# Patient Record
Sex: MALE | Race: BLACK OR AFRICAN AMERICAN | Employment: UNEMPLOYED | URBAN - METROPOLITAN AREA
[De-identification: names, ages, dates, MRNs, and addresses within clinical notes are randomized per-mention and may not be internally consistent; named-entity substitution may affect disease eponyms.]

---

## 2019-06-13 ENCOUNTER — OFFICE VISIT (OUTPATIENT)
Dept: FAMILY MEDICINE CLINIC | Age: 24
End: 2019-06-13

## 2019-06-13 VITALS
WEIGHT: 205 LBS | TEMPERATURE: 98.6 F | RESPIRATION RATE: 18 BRPM | SYSTOLIC BLOOD PRESSURE: 119 MMHG | DIASTOLIC BLOOD PRESSURE: 81 MMHG | HEART RATE: 77 BPM | BODY MASS INDEX: 30.36 KG/M2 | HEIGHT: 69 IN | OXYGEN SATURATION: 95 %

## 2019-06-13 DIAGNOSIS — G43.009 MIGRAINE WITHOUT AURA AND WITHOUT STATUS MIGRAINOSUS, NOT INTRACTABLE: Primary | ICD-10-CM

## 2019-06-13 DIAGNOSIS — R41.840 INATTENTION: ICD-10-CM

## 2019-06-13 DIAGNOSIS — R41.840 POOR CONCENTRATION: ICD-10-CM

## 2019-06-13 DIAGNOSIS — R44.1 VISUAL HALLUCINATIONS: ICD-10-CM

## 2019-06-13 DIAGNOSIS — R44.0 AUDITORY HALLUCINATION: ICD-10-CM

## 2019-06-13 DIAGNOSIS — Z11.3 SCREENING EXAMINATION FOR STD (SEXUALLY TRANSMITTED DISEASE): ICD-10-CM

## 2019-06-13 RX ORDER — SUMATRIPTAN 50 MG/1
50 TABLET, FILM COATED ORAL
Qty: 9 TAB | Refills: 1 | Status: SHIPPED | OUTPATIENT
Start: 2019-06-13 | End: 2019-06-13

## 2019-06-13 RX ORDER — NAPROXEN 500 MG/1
500 TABLET ORAL
Qty: 30 TAB | Refills: 1 | Status: SHIPPED | OUTPATIENT
Start: 2019-06-13 | End: 2019-08-06 | Stop reason: SDUPTHER

## 2019-06-13 NOTE — PROGRESS NOTES
Chief Complaint   Patient presents with   [address]    Migraine    Referral Request     Patient in office today to [facility]. Pt have c/o of migraines that began 4 months ago. Pt states pain varies in location. Pt notes 2-3 migraines per week,can last up to 30 mins. Pt states sleep helps with pain,. Pt has not treated with otc. Pt denies n/v,blurred vision. Pt have c/o of inability to focus, and unable to keep job due to issues. Pt was seen in [facility] last yr for a couple days. Pt was seen at [facility] 6 months ago, pt stayed for 1 month was dx with anxiety and depression. 1. Have you been to the ER, urgent care clinic since your last visit? Hospitalized since your last visit? No    2. Have you seen or consulted any other health care providers outside of the [address] [place] [address] since your last visit? Include any pap smears or colon screening.  No

## 2019-06-13 NOTE — PATIENT INSTRUCTIONS
[facility] 12 [address]  Phone [phone]    For therapy  Mann Laird LCSW  [facility]  [address] [facility], [address]  [phone]     Panic, Anxiety, and Depression [address], [address] [place] [address], [facility]  [phone]    25 [facility] [address] [place] [address] [place], [address]  Phone: [phone]  Fax: [phone]    [facility] 111 [facility], [address] [place] [address]  Phone: [phone]  Fax: [phone] 324 [facility], [address] [place] [address]  Phone: [phone]  Fax: [phone]           Migraine Headache: Care Instructions  Your Care Instructions  Migraines are painful, throbbing headaches that often start on one side of the head. They may cause nausea and vomiting and make you sensitive to light, sound, or smell. Without treatment, migraines can last from 4 hours to a few days. Medicines can help prevent migraines or stop them after they have started. Your doctor can help you find which ones work best for you. Follow-up care is a key part of your treatment and safety. Be sure to make and go to all appointments, and call your doctor if you are having problems. It's also a good idea to know your test results and keep a list of the medicines you take. How can you care for yourself at home? · Do not drive if you have taken a prescription pain medicine. · Rest in a quiet, dark room until your headache is gone. Close your eyes, and try to relax or go to sleep. Don't watch TV or read. · Put a cold, moist cloth or cold pack on the painful area for 10 to 20 minutes at a time. Put a thin cloth between the cold pack and your skin. · Use a warm, moist towel or a heating pad set on low to relax tight shoulder and neck muscles.   · Have someone gently massage your neck and shoulders. · Take your medicines exactly as prescribed. Call your doctor if you think you are having a problem with your medicine. You will get more details on the specific medicines your doctor prescribes. · Be careful not to take pain medicine more often than the instructions allow. You could get worse or more frequent headaches when the medicine wears off. To prevent migraines  · Keep a headache diary so you can figure out what triggers your headaches. Avoiding triggers may help you prevent headaches. Record when each headache began, how long it lasted, and what the pain was like. (Was it throbbing, aching, stabbing, or dull?) Write down any other symptoms you had with the headache, such as nausea, flashing lights or dark spots, or sensitivity to bright light or loud noise. Note if the headache occurred near your period. List anything that might have triggered the headache. Triggers may include certain foods (chocolate, cheese, wine) or odors, smoke, bright light, stress, or lack of sleep. · If your doctor has prescribed medicine for your migraines, take it as directed. You may have medicine that you take only when you get a migraine and medicine that you take all the time to help prevent migraines. ? If your doctor has prescribed medicine for when you get a headache, take it at the first sign of a migraine, unless your doctor has given you other instructions. ? If your doctor has prescribed medicine to prevent migraines, take it exactly as prescribed. Call your doctor if you think you are having a problem with your medicine. · Find healthy ways to deal with stress. Migraines are most common during or right after stressful times. Take time to relax before and after you do something that has caused a migraine in the past.  · Try to keep your muscles relaxed by keeping good posture. Check your jaw, face, neck, and shoulder muscles for tension. Try to relax them.  When you sit at a desk, change positions often. And make sure to stretch for 30 seconds each hour. · Get plenty of sleep and exercise. · Eat meals on a regular schedule. Avoid foods and drinks that often trigger migraines. These include chocolate, alcohol (especially red wine and port), aspartame, monosodium glutamate (MSG), and some additives found in foods (such as hot dogs, shelton, cold cuts, aged cheeses, and pickled foods). · Limit caffeine. Don't drink too much coffee, tea, or soda. But don't quit caffeine suddenly. That can also give you migraines. · Do not smoke or allow others to smoke around you. If you need help quitting, talk to your doctor about stop-smoking programs and medicines. These can increase your chances of quitting for good. · If you are taking birth control pills or hormone therapy, talk to your doctor about whether they are triggering your migraines. When should you call for help? Call 911 anytime you think you may need emergency care. For example, call if:    · You have signs of a stroke. These may include:  ? Sudden numbness, paralysis, or weakness in your face, arm, or leg, especially on only one side of your body. ? Sudden vision changes. ? Sudden trouble speaking. ? Sudden confusion or trouble understanding simple statements. ? Sudden problems with walking or balance. ? A sudden, severe headache that is different from past headaches.    Call your doctor now or seek immediate medical care if:    · You have new or worse nausea and vomiting.     · You have a new or higher fever.     · Your headache gets much worse.    Watch closely for changes in your health, and be sure to contact your doctor if:    · You are not getting better after 2 days (48 hours). Where can you learn more? Go to http://anna-doe.info/. Enter G276 in the search box to learn more about \"Migraine Headache: Care Instructions. \"  Current as of: Odilia 3, 2018  Content Version: 11.9  © 1112-2459 [employer], [facility].  Care instructions adapted under license by [employer] (which disclaims liability or warranty for this information). If you have questions about a medical condition or this instruction, always ask your healthcare professional. [address] any warranty or liability for your use of this information.

## 2019-06-13 NOTE — PROGRESS NOTES
Chief Complaint   Patient presents with   [address]    Migraine    Referral Request     Patient in office today to [facility]. Pt have c/o of migraines that began 4 months ago. Pt states pain varies in location. No consistent location. Positive photophobia. Denies any phonophobia. Reports loud music will help the headaches. Pt notes 2-3 migraines per week, can last up to 30 mins- 1 hour. Doesn't usually have more than one HA per day. Usually HA comes as the day goes by. Usually notices HA start when he starts thinking about certain things. Pt states sleep helps with pain. Pt has not treated with otc. Pt denies n/v, blurred vision. Poor memory at times. Poor focus and concentration. Pt has been out of work for the last year. Prior to that was working at [employer]. Pt have c/o of inability to focus, and unable to keep job due to issues. Pt was seen in [facility] last yr for a couple days. Pt was admitted for 1 week. Pt was seen at [facility] 6 months ago, pt stayed for 1 month was dx with anxiety and depression. Pt was referred to a counselor and only went for about 3 weeks. Pt will have auditory and visual hallucinations depending on the day. Hard to predict. Will report to his mother \"this not me. \"  Calos Prost being prescribed a \"blue pill\" for 1 month. Uses marijuana, not daily. Drinks etoh on occasion. Mom denies any family history of mental health problems. Denies any known exposure to STI. Denies any other concerns at this time. Chief Complaint   Patient presents with   [address]    Migraine    Referral Request     he is a 24y.o. year old male who presents for evalution. Reviewed PmHx, RxHx, FmHx, SocHx, AllgHx and updated and dated in the chart.     Review of Systems - negative except as listed above in the HPI    Objective:     Vitals:    06/13/19 1341   BP: 119/81   Pulse: 77   Resp: 18   Temp: 98.6 °F (37 °C)   TempSrc: Oral   SpO2: 95% Weight: 205 lb (93 kg)   Height: 5' 9.25\" (1.759 m)     Physical Examination: General appearance - alert, well appearing, and in no distress  Mental status - normal mood, behavior, speech, dress, motor activity, and thought processes  Eyes - pupils equal and reactive, extraocular eye movements intact  Ears - bilateral TM's and external ear canals normal  Nose - normal and patent, no erythema, discharge or polyps and normal nontender sinuses  Mouth - mucous membranes moist, pharynx normal without lesions  Neck - supple, no significant adenopathy, carotids upstroke normal bilaterally, no bruits, thyroid exam: thyroid is normal in size without nodules or tenderness  Chest - clear to auscultation, no wheezes, rales or rhonchi, symmetric air entry  Heart - normal rate, regular rhythm, normal S1, S2, no murmurs  Abdomen - soft, nontender, nondistended, no masses or organomegaly  bowel sounds normal  Neurological - DTR's normal and symmetric, motor and sensory grossly normal bilaterally, normal muscle tone, no tremors, strength 5/5  Extremities - peripheral pulses normal, no pedal edema, no clubbing or cyanosis  Skin - normal coloration and turgor, no rashes, no suspicious skin lesions noted    Assessment/ Plan:   Diagnoses and all orders for this visit:    1. Migraine without aura and without status migrainosus, not intractable  -     METABOLIC PANEL, COMPREHENSIVE  -     CBC WITH AUTOMATED DIFF  -     TSH 3RD GENERATION  -     HEMOGLOBIN A1C WITH EAG  -     VITAMIN D, 25 HYDROXY  -     IRON PROFILE  -     FERRITIN  -     SUMAtriptan (IMITREX) 50 mg tablet; Take 1 Tab by mouth once as needed for Migraine for up to 1 dose.  -     naproxen (NAPROSYN) 500 mg tablet; Take 1 Tab by mouth daily as needed (headache). Will notify results and deviate plan based on findings. Start imitrex and naproxen daily as needed for headache. Reviewed SEs/ADRs of medication.  Enc pt to keep a HA diary and follow up to review together if HAs persist or worsen. 2. Inattention / 3. Poor concentration / 4. Visual hallucinations / 5. Auditory hallucination  -     REFERRAL TO PSYCHIATRY  Discussed need to see psychiatry for further evaluation due to concern for schizophrenia or other mental health problem. 6. Screening examination for STD (sexually transmitted disease)  -     HIV 1/2 AG/AB, 4TH GENERATION,W RFLX CONFIRM  -     RPR  -     CT/NG/T.VAGINALIS AMPLIFICATION  Screening, asx. Follow-up and Dispositions    · Return if symptoms worsen or fail to improve. I have discussed the diagnosis with the patient and the intended plan as seen in the above orders. The patient has received an after-visit summary and questions were answered concerning future plans. Medication Side Effects and Warnings were discussed with patient: yes  Patient Labs were reviewed and or requested: yes  Patient Past Records were reviewed and or requested  yes  Patient / Caregiver Understanding of treatment plan was verbalized during office visit YES    PRINCESS Posadas    Patient Instructions     [facility] [address] [place] [address],[address]  [facility]  Phone [phone]    For therapy  Carlton Gomes LCSW  [facility]  [address] [facility]   [address]  [phone]     [facility] [address], [address] [place] [address]  [phone]    [address] [place] [address] Lee [address]  Phone: [phone]  Fax: [phone]    [address] [place] [address]  Phone: [phone]  Fax: [phone] Critical access hospital6 [facility], [address] [place] [address]  Phone: [phone]  Fax: [phone]           Migraine Headache: Care Instructions  Your Care Instructions  Migraines are painful, throbbing headaches that often start on one side of the head. They may cause nausea and vomiting and make you sensitive to light, sound, or smell. Without treatment, migraines can last from 4 hours to a few days. Medicines can help prevent migraines or stop them after they have started. Your doctor can help you find which ones work best for you. Follow-up care is a key part of your treatment and safety. Be sure to make and go to all appointments, and call your doctor if you are having problems. It's also a good idea to know your test results and keep a list of the medicines you take. How can you care for yourself at home? · Do not drive if you have taken a prescription pain medicine. · Rest in a quiet, dark room until your headache is gone. Close your eyes, and try to relax or go to sleep. Don't watch TV or read. · Put a cold, moist cloth or cold pack on the painful area for 10 to 20 minutes at a time. Put a thin cloth between the cold pack and your skin. · Use a warm, moist towel or a heating pad set on low to relax tight shoulder and neck muscles. · Have someone gently massage your neck and shoulders. · Take your medicines exactly as prescribed. Call your doctor if you think you are having a problem with your medicine. You will get more details on the specific medicines your doctor prescribes. · Be careful not to take pain medicine more often than the instructions allow. You could get worse or more frequent headaches when the medicine wears off. To prevent migraines  · Keep a headache diary so you can figure out what triggers your headaches. Avoiding triggers may help you prevent headaches. Record when each headache began, how long it lasted, and what the pain was like. (Was it throbbing, aching, stabbing, or dull?) Write down any other symptoms you had with the headache, such as nausea, flashing lights or dark spots, or sensitivity to bright light or loud noise. Note if the headache occurred near your period. List anything that might have triggered the headache. Triggers may include certain foods (chocolate, cheese, wine) or odors, smoke, bright light, stress, or lack of sleep. · If your doctor has prescribed medicine for your migraines, take it as directed. You may have medicine that you take only when you get a migraine and medicine that you take all the time to help prevent migraines. ? If your doctor has prescribed medicine for when you get a headache, take it at the first sign of a migraine, unless your doctor has given you other instructions. ? If your doctor has prescribed medicine to prevent migraines, take it exactly as prescribed. Call your doctor if you think you are having a problem with your medicine. · Find healthy ways to deal with stress. Migraines are most common during or right after stressful times. Take time to relax before and after you do something that has caused a migraine in the past.  · Try to keep your muscles relaxed by keeping good posture. Check your jaw, face, neck, and shoulder muscles for tension. Try to relax them. When you sit at a desk, change positions often. And make sure to stretch for 30 seconds each hour. · Get plenty of sleep and exercise. · Eat meals on a regular schedule. Avoid foods and drinks that often trigger migraines. These include chocolate, alcohol (especially red wine and port), aspartame, monosodium glutamate (MSG), and some additives found in foods (such as hot dogs, shelton, cold cuts, aged cheeses, and pickled foods). · Limit caffeine. Don't drink too much coffee, tea, or soda. But don't quit caffeine suddenly. That can also give you migraines. · Do not smoke or allow others to smoke around you. If you need help quitting, talk to your doctor about stop-smoking programs and medicines. These can increase your chances of quitting for good. · If you are taking birth control pills or hormone therapy, talk to your doctor about whether they are triggering your migraines. When should you call for help?   Call [phone] anytime you think you may need emergency care. For example, call if:    · You have signs of a stroke. These may include:  ? Sudden numbness, paralysis, or weakness in your face, arm, or leg, especially on only one side of your body. ? Sudden vision changes. ? Sudden trouble speaking. ? Sudden confusion or trouble understanding simple statements. ? Sudden problems with walking or balance. ? A sudden, severe headache that is different from past headaches.    Call your doctor now or seek immediate medical care if:    · You have new or worse nausea and vomiting.     · You have a new or higher fever.     · Your headache gets much worse.    Watch closely for changes in your health, and be sure to contact your doctor if:    · You are not getting better after 2 days (48 hours). Where can you learn more? Go to http://anna-doe.info/. Enter U867 in the search box to learn more about \"Migraine Headache: Care Instructions. \"  Current as of: Odilia 3, 2018  Content Version: 11.9  © 7253-4797 [employer]. Care instructions adapted under license by [employer] (which disclaims liability or warranty for this information). If you have questions about a medical condition or this instruction, always ask your healthcare professional. [address] any warranty or liability for your use of this information.

## 2019-06-15 LAB
25(OH)D3+25(OH)D2 SERPL-MCNC: 16.5 NG/ML (ref 30–100)
ALBUMIN SERPL-MCNC: 4.6 G/DL (ref 3.5–5.5)
ALBUMIN/GLOB SERPL: 1.4 {RATIO} (ref 1.2–2.2)
ALP SERPL-CCNC: 88 IU/L (ref 39–117)
ALT SERPL-CCNC: 14 IU/L (ref 0–44)
AST SERPL-CCNC: 13 IU/L (ref 0–40)
BASOPHILS # BLD AUTO: 0 X10E3/UL (ref 0–0.2)
BASOPHILS NFR BLD AUTO: 1 %
BILIRUB SERPL-MCNC: 0.4 MG/DL (ref 0–1.2)
BUN SERPL-MCNC: 8 MG/DL (ref 6–20)
BUN/CREAT SERPL: 10 (ref 9–20)
C TRACH RRNA SPEC QL NAA+PROBE: NEGATIVE
CALCIUM SERPL-MCNC: 10.1 MG/DL (ref 8.7–10.2)
CHLORIDE SERPL-SCNC: 102 MMOL/L (ref 96–106)
CO2 SERPL-SCNC: 24 MMOL/L (ref 20–29)
CREAT SERPL-MCNC: 0.8 MG/DL (ref 0.76–1.27)
EOSINOPHIL # BLD AUTO: 0.4 X10E3/UL (ref 0–0.4)
EOSINOPHIL NFR BLD AUTO: 5 %
ERYTHROCYTE [DISTWIDTH] IN BLOOD BY AUTOMATED COUNT: 14.8 % (ref 12.3–15.4)
EST. AVERAGE GLUCOSE BLD GHB EST-MCNC: 120 MG/DL
FERRITIN SERPL-MCNC: 132 NG/ML (ref 30–400)
GLOBULIN SER CALC-MCNC: 3.4 G/DL (ref 1.5–4.5)
GLUCOSE SERPL-MCNC: 82 MG/DL (ref 65–99)
HBA1C MFR BLD: 5.8 % (ref 4.8–5.6)
HCT VFR BLD AUTO: 41.8 % (ref 37.5–51)
HGB BLD-MCNC: 14.2 G/DL (ref 13–17.7)
HIV 1+2 AB+HIV1 P24 AG SERPL QL IA: NON REACTIVE
IMM GRANULOCYTES # BLD AUTO: 0 X10E3/UL (ref 0–0.1)
IMM GRANULOCYTES NFR BLD AUTO: 0 %
IRON SATN MFR SERPL: 49 % (ref 15–55)
IRON SERPL-MCNC: 128 UG/DL (ref 38–169)
LYMPHOCYTES # BLD AUTO: 2.4 X10E3/UL (ref 0.7–3.1)
LYMPHOCYTES NFR BLD AUTO: 30 %
MCH RBC QN AUTO: 29.8 PG (ref 26.6–33)
MCHC RBC AUTO-ENTMCNC: 34 G/DL (ref 31.5–35.7)
MCV RBC AUTO: 88 FL (ref 79–97)
MONOCYTES # BLD AUTO: 0.5 X10E3/UL (ref 0.1–0.9)
MONOCYTES NFR BLD AUTO: 6 %
N GONORRHOEA RRNA SPEC QL NAA+PROBE: NEGATIVE
NEUTROPHILS # BLD AUTO: 4.6 X10E3/UL (ref 1.4–7)
NEUTROPHILS NFR BLD AUTO: 58 %
PLATELET # BLD AUTO: 378 X10E3/UL (ref 150–450)
POTASSIUM SERPL-SCNC: 4.4 MMOL/L (ref 3.5–5.2)
PROT SERPL-MCNC: 8 G/DL (ref 6–8.5)
RBC # BLD AUTO: 4.76 X10E6/UL (ref 4.14–5.8)
RPR SER QL: NON REACTIVE
SODIUM SERPL-SCNC: 139 MMOL/L (ref 134–144)
T VAGINALIS RRNA VAG QL NAA+PROBE: NEGATIVE
TIBC SERPL-MCNC: 262 UG/DL (ref 250–450)
TSH SERPL DL<=0.005 MIU/L-ACNC: 1.48 UIU/ML (ref 0.45–4.5)
UIBC SERPL-MCNC: 134 UG/DL (ref 111–343)
WBC # BLD AUTO: 7.9 X10E3/UL (ref 3.4–10.8)

## 2019-06-16 PROBLEM — R73.03 PREDIABETES: Status: ACTIVE | Noted: 2019-06-16

## 2019-06-16 RX ORDER — ERGOCALCIFEROL 1.25 MG/1
50000 CAPSULE ORAL
Qty: 12 CAP | Refills: 0 | Status: ON HOLD | OUTPATIENT
Start: 2019-06-16 | End: 2020-07-21

## 2019-06-16 NOTE — PROGRESS NOTES
Please notify pt the followin. Negative for HIV and syphilis. 2. Iron levels are normal.   3. Vitamin D is very low. Weekly high dose vitamin D sent to pharmacy on file to take over the next 12 weeks. 4. Labs show he has prediabetes. Enc pt to work on following a low carb diet and increase exercise. I recommend he follow up in 3 months to repeat labs. All other labs are normal. Work on diet to improve sugar. Take weekly vitamin D as directed. Follow up in 3 months to repeat labs.

## 2019-08-06 DIAGNOSIS — G43.009 MIGRAINE WITHOUT AURA AND WITHOUT STATUS MIGRAINOSUS, NOT INTRACTABLE: ICD-10-CM

## 2019-08-06 RX ORDER — NAPROXEN 500 MG/1
TABLET ORAL
Qty: 30 TAB | Refills: 1 | Status: ON HOLD | OUTPATIENT
Start: 2019-08-06 | End: 2020-07-21

## 2020-07-17 ENCOUNTER — HOSPITAL ENCOUNTER (INPATIENT)
Age: 25
LOS: 13 days | Discharge: HOME | DRG: 885 | End: 2020-07-30
Attending: PSYCHIATRY & NEUROLOGY | Admitting: PSYCHIATRY & NEUROLOGY
Payer: COMMERCIAL

## 2020-07-17 ENCOUNTER — HOSPITAL ENCOUNTER (EMERGENCY)
Age: 25
Discharge: ANOTHER HOSPITAL | End: 2020-07-17
Attending: EMERGENCY MEDICINE
Payer: MEDICAID

## 2020-07-17 VITALS
RESPIRATION RATE: 18 BRPM | WEIGHT: 205 LBS | HEART RATE: 76 BPM | SYSTOLIC BLOOD PRESSURE: 132 MMHG | BODY MASS INDEX: 30.36 KG/M2 | TEMPERATURE: 98.2 F | HEIGHT: 69 IN | DIASTOLIC BLOOD PRESSURE: 84 MMHG | OXYGEN SATURATION: 98 %

## 2020-07-17 DIAGNOSIS — F29 PSYCHOSIS, UNSPECIFIED PSYCHOSIS TYPE (HCC): Primary | ICD-10-CM

## 2020-07-17 PROBLEM — F20.9 SCHIZOPHRENIA, UNSPECIFIED (HCC): Status: ACTIVE | Noted: 2020-07-17

## 2020-07-17 LAB
ALBUMIN SERPL-MCNC: 3.9 G/DL (ref 3.5–5)
ALBUMIN/GLOB SERPL: 1 {RATIO} (ref 1.1–2.2)
ALP SERPL-CCNC: 76 U/L (ref 45–117)
ALT SERPL-CCNC: 27 U/L (ref 12–78)
AMPHET UR QL SCN: NEGATIVE
ANION GAP SERPL CALC-SCNC: 8 MMOL/L (ref 5–15)
AST SERPL-CCNC: 35 U/L (ref 15–37)
BARBITURATES UR QL SCN: NEGATIVE
BASOPHILS # BLD: 0.1 K/UL (ref 0–0.1)
BASOPHILS NFR BLD: 1 % (ref 0–1)
BENZODIAZ UR QL: NEGATIVE
BILIRUB SERPL-MCNC: 0.4 MG/DL (ref 0.2–1)
BUN SERPL-MCNC: 10 MG/DL (ref 6–20)
BUN/CREAT SERPL: 9 (ref 12–20)
CALCIUM SERPL-MCNC: 9 MG/DL (ref 8.5–10.1)
CANNABINOIDS UR QL SCN: POSITIVE
CHLORIDE SERPL-SCNC: 104 MMOL/L (ref 97–108)
CO2 SERPL-SCNC: 28 MMOL/L (ref 21–32)
COCAINE UR QL SCN: NEGATIVE
CREAT SERPL-MCNC: 1.06 MG/DL (ref 0.7–1.3)
DIFFERENTIAL METHOD BLD: ABNORMAL
DRUG SCRN COMMENT,DRGCM: ABNORMAL
EOSINOPHIL # BLD: 0.2 K/UL (ref 0–0.4)
EOSINOPHIL NFR BLD: 2 % (ref 0–7)
ERYTHROCYTE [DISTWIDTH] IN BLOOD BY AUTOMATED COUNT: 12.9 % (ref 11.5–14.5)
ETHANOL SERPL-MCNC: <10 MG/DL
GLOBULIN SER CALC-MCNC: 4 G/DL (ref 2–4)
GLUCOSE SERPL-MCNC: 111 MG/DL (ref 65–100)
HCT VFR BLD AUTO: 38.8 % (ref 36.6–50.3)
HGB BLD-MCNC: 13.5 G/DL (ref 12.1–17)
IMM GRANULOCYTES # BLD AUTO: 0 K/UL (ref 0–0.04)
IMM GRANULOCYTES NFR BLD AUTO: 0 % (ref 0–0.5)
LYMPHOCYTES # BLD: 2.2 K/UL (ref 0.8–3.5)
LYMPHOCYTES NFR BLD: 17 % (ref 12–49)
MCH RBC QN AUTO: 30 PG (ref 26–34)
MCHC RBC AUTO-ENTMCNC: 34.8 G/DL (ref 30–36.5)
MCV RBC AUTO: 86.2 FL (ref 80–99)
METHADONE UR QL: NEGATIVE
MONOCYTES # BLD: 1.1 K/UL (ref 0–1)
MONOCYTES NFR BLD: 8 % (ref 5–13)
NEUTS SEG # BLD: 9.5 K/UL (ref 1.8–8)
NEUTS SEG NFR BLD: 72 % (ref 32–75)
NRBC # BLD: 0 K/UL (ref 0–0.01)
NRBC BLD-RTO: 0 PER 100 WBC
OPIATES UR QL: NEGATIVE
PCP UR QL: NEGATIVE
PLATELET # BLD AUTO: 265 K/UL (ref 150–400)
PMV BLD AUTO: 10.4 FL (ref 8.9–12.9)
POTASSIUM SERPL-SCNC: 3.4 MMOL/L (ref 3.5–5.1)
PROT SERPL-MCNC: 7.9 G/DL (ref 6.4–8.2)
RBC # BLD AUTO: 4.5 M/UL (ref 4.1–5.7)
SODIUM SERPL-SCNC: 140 MMOL/L (ref 136–145)
TSH SERPL DL<=0.05 MIU/L-ACNC: 0.68 UIU/ML (ref 0.36–3.74)
WBC # BLD AUTO: 13.1 K/UL (ref 4.1–11.1)

## 2020-07-17 PROCEDURE — 65220000003 HC RM SEMIPRIVATE PSYCH

## 2020-07-17 PROCEDURE — 80053 COMPREHEN METABOLIC PANEL: CPT

## 2020-07-17 PROCEDURE — 74011250637 HC RX REV CODE- 250/637: Performed by: EMERGENCY MEDICINE

## 2020-07-17 PROCEDURE — 84443 ASSAY THYROID STIM HORMONE: CPT

## 2020-07-17 PROCEDURE — 85025 COMPLETE CBC W/AUTO DIFF WBC: CPT

## 2020-07-17 PROCEDURE — 36415 COLL VENOUS BLD VENIPUNCTURE: CPT

## 2020-07-17 PROCEDURE — 74011000250 HC RX REV CODE- 250: Performed by: EMERGENCY MEDICINE

## 2020-07-17 PROCEDURE — 80307 DRUG TEST PRSMV CHEM ANLYZR: CPT

## 2020-07-17 PROCEDURE — 74011250636 HC RX REV CODE- 250/636: Performed by: NURSE PRACTITIONER

## 2020-07-17 PROCEDURE — 99285 EMERGENCY DEPT VISIT HI MDM: CPT

## 2020-07-17 RX ORDER — LORAZEPAM 2 MG/ML
1 INJECTION INTRAMUSCULAR
Status: DISCONTINUED | OUTPATIENT
Start: 2020-07-17 | End: 2020-07-21

## 2020-07-17 RX ORDER — ACETAMINOPHEN 325 MG/1
650 TABLET ORAL
Status: DISCONTINUED | OUTPATIENT
Start: 2020-07-17 | End: 2020-07-30 | Stop reason: HOSPADM

## 2020-07-17 RX ORDER — OLANZAPINE 5 MG/1
5 TABLET ORAL
Status: DISCONTINUED | OUTPATIENT
Start: 2020-07-17 | End: 2020-07-30 | Stop reason: HOSPADM

## 2020-07-17 RX ORDER — BENZTROPINE MESYLATE 1 MG/1
1 TABLET ORAL
Status: DISCONTINUED | OUTPATIENT
Start: 2020-07-17 | End: 2020-07-30 | Stop reason: HOSPADM

## 2020-07-17 RX ORDER — HYDROXYZINE 50 MG/1
50 TABLET, FILM COATED ORAL
Status: DISCONTINUED | OUTPATIENT
Start: 2020-07-17 | End: 2020-07-30 | Stop reason: HOSPADM

## 2020-07-17 RX ORDER — ADHESIVE BANDAGE
30 BANDAGE TOPICAL DAILY PRN
Status: DISCONTINUED | OUTPATIENT
Start: 2020-07-17 | End: 2020-07-30 | Stop reason: HOSPADM

## 2020-07-17 RX ORDER — DIPHENHYDRAMINE HYDROCHLORIDE 50 MG/ML
50 INJECTION, SOLUTION INTRAMUSCULAR; INTRAVENOUS
Status: DISCONTINUED | OUTPATIENT
Start: 2020-07-17 | End: 2020-07-21

## 2020-07-17 RX ORDER — OLANZAPINE 5 MG/1
10 TABLET, ORALLY DISINTEGRATING ORAL ONCE
Status: COMPLETED | OUTPATIENT
Start: 2020-07-17 | End: 2020-07-17

## 2020-07-17 RX ORDER — HALOPERIDOL 5 MG/ML
5 INJECTION INTRAMUSCULAR
Status: DISCONTINUED | OUTPATIENT
Start: 2020-07-17 | End: 2020-07-28

## 2020-07-17 RX ORDER — TRAZODONE HYDROCHLORIDE 50 MG/1
50 TABLET ORAL
Status: DISCONTINUED | OUTPATIENT
Start: 2020-07-17 | End: 2020-07-30 | Stop reason: HOSPADM

## 2020-07-17 RX ORDER — OLANZAPINE 5 MG/1
10 TABLET, ORALLY DISINTEGRATING ORAL
Status: DISCONTINUED | OUTPATIENT
Start: 2020-07-17 | End: 2020-07-17

## 2020-07-17 RX ORDER — BACITRACIN 500 UNIT/G
1 PACKET (EA) TOPICAL
Status: COMPLETED | OUTPATIENT
Start: 2020-07-17 | End: 2020-07-17

## 2020-07-17 RX ADMIN — HALOPERIDOL LACTATE 5 MG: 5 INJECTION, SOLUTION INTRAMUSCULAR at 21:10

## 2020-07-17 RX ADMIN — OLANZAPINE 10 MG: 5 TABLET, ORALLY DISINTEGRATING ORAL at 15:54

## 2020-07-17 RX ADMIN — LORAZEPAM 1 MG: 2 INJECTION INTRAMUSCULAR; INTRAVENOUS at 21:10

## 2020-07-17 RX ADMIN — BACITRACIN 1 PACKET: 500 OINTMENT TOPICAL at 13:22

## 2020-07-17 RX ADMIN — DIPHENHYDRAMINE HYDROCHLORIDE 50 MG: 50 INJECTION, SOLUTION INTRAMUSCULAR; INTRAVENOUS at 21:20

## 2020-07-17 NOTE — ED NOTES
TRANSFER - OUT REPORT:    Verbal report given to Kaley goodrichRN(name) on Anna Romeo  being transferred to Behavior health(unit) for routine progression of care       Report consisted of patients Situation, Background, Assessment and   Recommendations(SBAR). Information from the following report(s) SBAR, Kardex, ED Summary, [facility] and Recent Results was reviewed with the receiving nurse. Lines:       Opportunity for questions and clarification was provided.       Patient transported with:   [facility] Department

## 2020-07-17 NOTE — ED NOTES
Patient presents to ED under an ECO with c/o mental health. Police states that they were called to a hotel because patient was refusing to leave property and dancing and acting bizarre. .  Patient then went and sat in a  seat of the police car and refused to move. Was acting very bizarre therefore placed under an ECO and brought to the emergency department for evaluation. Patient is very tangential and unable to provide additional history or review of systems on my evaluation. Patient was refusing to answer questions and provide name to police. Respirations are at a regular rate, depth, and pattern. Call bell within reach. Will continue to monitor. Please reference nursing assessment. [employer] remains at bedside.

## 2020-07-17 NOTE — BH NOTES
At 2120:  PRN Medication Documentation    Specific patient behavior that led to need for PRN medication: possible dystonic reaction  Staff interventions attempted prior to PRN being given: patient placed in room  PRN medication given: benadryl  Patient response/effectiveness of PRN medication: will continue to monitor    At 2110:  PRN Medication Documentation    Specific patient behavior that led to need for PRN medication: agitation and increased aggression with verbal physical threats during admission process  Staff interventions attempted prior to PRN being given: therapeutic listening  PRN medication given: Ativan and Haldol  Patient response/effectiveness of PRN medication: will continue to monitor    At 2100:  Patient arrived via ambulatory in [facility] with PD from [id number] [facility] under the treatment of Dr. Angelo Rojas. Admission status TDO. PTA medication(s) verified by: need verifying. Patient chief complaint altered mental status with AV+. Patient oriented to unit and room. Patient behavior: agitated and irritable  Safety: Q15 min safety checks    Skin and Pressure Documentation  Primary Nurse, Parish Brooks RN and Rashid Hartman performed a dual skin assessment on this patient No impairment noted  Bob score is 23     Pressure Injury Documentation  (COMPLETE ONE LABEL PER PRESSURE INJURY)  For further information, please review corresponding Wound Care flowsheet. Anna Romeo has: No pressure injury noted and pressure ulcer prevention initiated. No pressure injury noted and pressure ulcer prevention initiated. TRANSFER - IN REPORT:    Verbal report received from Andi Koroma RN on Anna Romeo  being received from [address] [facility] for routine progression of care      Report consisted of patients Situation, Background, Assessment and   Recommendations(SBAR). Information from the following report(s) SBAR was reviewed with the receiving nurse.     Opportunity for questions and clarification was provided. Assessment completed upon patients arrival to unit and care assumed. Patient refused to leave motel, then sat in police cruiser and refused to move. UDS + THC. Patient delusional and laughs inappropriately. Patient is redirectable at times and sings to answer questions. Patient is homeless.

## 2020-07-17 NOTE — ED NOTES
Rayshawn Eagle from crisis who states that he touch base with the patient's mother and that he does have a long history of mental illness and was hospitalized multiple times. Apparently the family has been unable to locate him for several weeks and was relieved to learn that he was in the emergency department and will be getting help with his mental health.

## 2020-07-17 NOTE — ED PROVIDER NOTES
EMERGENCY DEPARTMENT HISTORY AND PHYSICAL EXAM      Date: 7/17/2020  Patient Name: Porter Freeman    History of Presenting Illness     Chief Complaint   Patient presents with   [address] Problem       History Provided By: Patient and police    HPI: Porter Freeman, 21 y.o. male with PMHx significant for unknown who presents in police custody for mental health problem. Apparently, patient was at a motel and they were not able to get him to leave. Patient then went and sat in a  seat of the police car and refused to move. Was acting very bizarre therefore placed under an ECO and brought to the emergency department for evaluation. Patient is very tangential and unable to provide additional history or review of systems on my evaluation. PCP: None    There are no other complaints, changes, or physical findings at this time. Current Outpatient Medications   Medication Sig Dispense Refill    naproxen (NAPROSYN) 500 mg tablet TAKE 1 TABLET BY MOUTH EVERY DAY AS NEEDED HEADACHE 30 Tab 1    ergocalciferol (ERGOCALCIFEROL) 50,000 unit capsule Take 1 Cap by mouth every seven (7) days. 12 Cap 0     Past History     Past Medical History:  No past medical history on file. Past Surgical History:  No past surgical history on file. Family History:  Family History   Problem Relation Age of Onset    Diabetes Paternal Uncle     Diabetes Maternal Grandmother      Social History:  Social History     Tobacco Use    Smoking status: Current Every Day Smoker     Packs/day: 0.25    Smokeless tobacco: Never Used   Substance Use Topics    Alcohol use: Yes     Alcohol/week: 3.0 standard drinks     Types: 3 Shots of liquor per week    Drug use: Yes     Frequency: 3.0 times per week     Types: Marijuana     Allergies:  No Known Allergies  Review of Systems   Review of Systems   Unable to perform ROS: Psychiatric disorder     Physical Exam   Physical Exam  Vitals signs and nursing note reviewed.    Constitutional: General: He is not in acute distress. Appearance: He is well-developed. HENT:      Head: Normocephalic and atraumatic. Eyes:      Conjunctiva/sclera: Conjunctivae normal.      Pupils: Pupils are equal, round, and reactive to light. Neck:      Musculoskeletal: Normal range of motion. Cardiovascular:      Rate and Rhythm: Normal rate and regular rhythm. Pulmonary:      Effort: Pulmonary effort is normal. No respiratory distress. Breath sounds: Normal breath sounds. No stridor. Abdominal:      General: There is no distension. Palpations: Abdomen is soft. Tenderness: There is no abdominal tenderness. Musculoskeletal: Normal range of motion. Skin:     General: Skin is warm and dry. Comments: Wound to the base of the right foot that does not appear infected   Neurological:      Mental Status: He is alert. Psychiatric:         Attention and Perception: He is inattentive. Mood and Affect: Affect is labile. Speech: Speech is tangential.         Behavior: Behavior is agitated. Diagnostic Study Results   Labs -     Recent Results (from the past 12 hour(s))   CBC WITH AUTOMATED DIFF    Collection Time: 07/17/20  1:19 PM   Result Value Ref Range    WBC 13.1 (H) 4.1 - 11.1 K/uL    RBC 4.50 4. 10 - 5.70 M/uL    HGB 13.5 12.1 - 17.0 g/dL    HCT 38.8 36.6 - 50.3 %    MCV 86.2 80.0 - 99.0 FL    MCH 30.0 26.0 - 34.0 PG    MCHC 34.8 30.0 - 36.5 g/dL    RDW 12.9 11.5 - 14.5 %    PLATELET 144 447 - 747 K/uL    MPV 10.4 8.9 - 12.9 FL    NRBC 0.0 0  WBC    ABSOLUTE NRBC 0.00 0.00 - 0.01 K/uL    NEUTROPHILS 72 32 - 75 %    LYMPHOCYTES 17 12 - 49 %    MONOCYTES 8 5 - 13 %    EOSINOPHILS 2 0 - 7 %    BASOPHILS 1 0 - 1 %    IMMATURE GRANULOCYTES 0 0.0 - 0.5 %    ABS. NEUTROPHILS 9.5 (H) 1.8 - 8.0 K/UL    ABS. LYMPHOCYTES 2.2 0.8 - 3.5 K/UL    ABS. MONOCYTES 1.1 (H) 0.0 - 1.0 K/UL    ABS. EOSINOPHILS 0.2 0.0 - 0.4 K/UL    ABS. BASOPHILS 0.1 0.0 - 0.1 K/UL    ABS. IMM. GRANS. 0.0 0.00 - 0.04 K/UL    DF AUTOMATED     METABOLIC PANEL, COMPREHENSIVE    Collection Time: 07/17/20  1:19 PM   Result Value Ref Range    Sodium 140 136 - 145 mmol/L    Potassium 3.4 (L) 3.5 - 5.1 mmol/L    Chloride 104 97 - 108 mmol/L    CO2 28 21 - 32 mmol/L    Anion gap 8 5 - 15 mmol/L    Glucose 111 (H) 65 - 100 mg/dL    BUN 10 6 - 20 MG/DL    Creatinine 1.06 0.70 - 1.30 MG/DL    BUN/Creatinine ratio 9 (L) 12 - 20      GFR est AA >60 >60 ml/min/1.73m2    GFR est non-AA >60 >60 ml/min/1.73m2    Calcium 9.0 8.5 - 10.1 MG/DL    Bilirubin, total 0.4 0.2 - 1.0 MG/DL    ALT (SGPT) 27 12 - 78 U/L    AST (SGOT) 35 15 - 37 U/L    Alk. phosphatase 76 45 - 117 U/L    Protein, total 7.9 6.4 - 8.2 g/dL    Albumin 3.9 3.5 - 5.0 g/dL    Globulin 4.0 2.0 - 4.0 g/dL    A-G Ratio 1.0 (L) 1.1 - 2.2     ETHYL ALCOHOL    Collection Time: 07/17/20  1:19 PM   Result Value Ref Range    ALCOHOL(ETHYL),SERUM <10 <10 MG/DL   TSH 3RD GENERATION    Collection Time: 07/17/20  1:19 PM   Result Value Ref Range    TSH 0.68 0.36 - 3.74 uIU/mL   DRUG SCREEN, URINE    Collection Time: 07/17/20  1:19 PM   Result Value Ref Range    AMPHETAMINES Negative NEG      BARBITURATES Negative NEG      BENZODIAZEPINES Negative NEG      COCAINE Negative NEG      METHADONE Negative NEG      OPIATES Negative NEG      PCP(PHENCYCLIDINE) Negative NEG      THC (TH-CANNABINOL) Positive (A) NEG      Drug screen comment (NOTE)        Radiologic Studies -   No orders to display     No results found. Medical Decision Making   I am the first provider for this patient. I reviewed the vital signs, available nursing notes, past medical history, past surgical history, family history and social history. Vital Signs-Reviewed the patient's vital signs.   Patient Vitals for the past 12 hrs:   Temp Pulse Resp BP SpO2   07/17/20 1842 98.2 °F (36.8 °C)       07/17/20 1757  72 16 107/63 100 %   07/17/20 1247   18 132/84        Pulse Oximetry Analysis - 100% on ra      Records Reviewed: Nursing Notes    Provider Notes (Medical Decision Making):   Patient presents in police custody for mental health problem. On my evaluation he is somewhat labile, tangential, intermittently agitated but able to be redirected. Will check basic labs, thyroid studies, alcohol level, UDS, crisis to see the patient. ED Course:   Initial assessment performed. The patients presenting problems have been discussed, and they are in agreement with the care plan formulated and outlined with them. I have encouraged them to ask questions as they arise throughout their visit. Spoke with Brigida Mills from crisis who states that he touch base with the patient's mother and that he does have a long history of mental illness and was hospitalized multiple times. Apparently the family has been unable to locate him for several weeks and was relieved to learn that he was in the emergency department and will be getting help with his mental health. ED Course as of Jul 17 1914   Fri Jul 17, 2020   1410 Patient is medically cleared for psych evaluation    [YC]   9504 Patient accepted at [facility] psych, Dr. Brendan Nunez      ED Course User Index  Carol Marie MD       Procedures:  Procedures    Critical Care:  none    Disposition:  Transfer to [facility] [place] psych    Diagnosis     Clinical Impression:   1. Psychosis, unspecified psychosis type ([facility] [address]        This note will not be viewable in [address] Please note that this dictation was completed with [employer], the computer voice recognition software. Quite often unanticipated grammatical, syntax, homophones, and other interpretive errors are inadvertently transcribed by the computer software. Please disregard these errors.   Please excuse any errors that have escaped final proofreading

## 2020-07-17 NOTE — ED NOTES
Patient resting in bed comfortably and in no acute distress and appears to be sleep. Patient provided meal tray, left at the bedside.

## 2020-07-18 PROCEDURE — 65220000003 HC RM SEMIPRIVATE PSYCH

## 2020-07-18 RX ORDER — LORATADINE 10 MG/1
10 TABLET ORAL
Status: DISCONTINUED | OUTPATIENT
Start: 2020-07-18 | End: 2020-07-30 | Stop reason: HOSPADM

## 2020-07-18 NOTE — INTERDISCIPLINARY ROUNDS
Behavioral Health Interdisciplinary Rounds     Patient Name: Ryan Reyes  Age: 21 y.o. Room/Bed:  734/02  Primary Diagnosis: <principal problem not specified>   Admission Status: TDO     Readmission within 30 days: no  Power of  in place: no  Patient requires a blocked bed: no          Reason for blocked bed:     VTE Prophylaxis: No    Mobility needs/Fall risk: no  Flu Vaccine : no   Nutritional Plan: no  Consults:          Labs/Testing due today?: no    Sleep hours:  8      Participation in Care/Groups:  no  Medication Compliant?: Yes  PRNS (last 24 hours): Antipsychotic (IM), Antianxiety and Anticholinergic    Restraints (last 24 hours):  no     CIWA (range last 24 hours):     COWS (range last 24 hours):      Alcohol screening (AUDIT) completed -   AUDIT Score: 0     If applicable, date SBIRT discussed in treatment team AND documented:   AUDIT Screen Score: AUDIT Score: 0      Tobacco - patient is a smoker: Have You Used Tobacco in the Past 30 Days: No  Illegal Drugs use: Have You Used Any Illegal Substances Over the Past 12 Months: No    24 hour chart check complete: yes     Patient goal(s) for today: Pt refused to meet with Tx Team   Treatment team focus/goals:  Reused face to face interview Psych Social Assessment with information / chart  Progress note Pt refused to meet with [address] [place] [address] team and he was in bed.     LOS:  1  Expected LOS: TBD    Financial concerns/prescription coverage:  TDO    Family contact:  Amina Bustillo ( Mother ) [phone]     Family requesting physician contact today:    Discharge plan: TBD - Maybe Homeless   Access to weapons :  TBD        Outpatient provider(s): TBD   Patient's preferred phone number for follow up call :     Participating treatment team members: REID Monique RN and Linh Longoria NP

## 2020-07-18 NOTE — ED NOTES
Emergency Department Nursing Plan of Care       The Nursing Plan of Care is developed from the Nursing assessment and Emergency Department Attending provider initial evaluation. The plan of care may be reviewed in the ED Provider note.     The Plan of Care was developed with the following considerations:   Patient / Family readiness to learn indicated by:verbalized understanding and successful return demonstration  Persons(s) to be included in education: patient  Barriers to Learning/Limitations:Cognitive/physical [address], RN    7/17/2020  1:00 PM

## 2020-07-18 NOTE — BH NOTES
PSYCHOSOCIAL ASSESSMENT  :Patient identifying info:  Anna Romeo is a 21 y.o., male admitted 7/17/2020  9:29 PM     Presenting problem and precipitating factors: Pt. was  admitted to [place] [address] under TDO due to psychosis and inability care for himself. The pt was staying at a local hotel but only money for a few days. Pt was asked by Mgr to leave the premise but he refused. Pt returned to sit in his car thus PD was called. TDO issued by [address]     Pt this morning refused to meet with [address] [place] [address] Team . SW agreed to contact family to gather information for the purposes of tx. Mental status assessment: Unable to access due to pt's refusal to participate with [address] [place] [address] team    Strengths: Supportive Mother & G- Mother    Collateral information: Piotr Velasco - Paternal  [address] Mother [phone] &Kaley Huff Mother - [phone]  SW spoke to both mother and grandmother and information gathered was generally the same. 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 Pt was also admitted to [facility] ( from [facility] - ) under TDO 2019 . He was  hospitalized for a month  and d/c with follow up and medications. Pt was non complaint with both. He was unable to work nor maintain any stability. He had temporary shelter at his father's house  and staying at hotels paid for by his mother and grandmother. Both agreed that before he began using THC there were no issues with any s/s of possible mental health. Pt has been seen by both talking to others who were not there, disorganized thinking and appearing to be paranoid.   Both are supportive and they agreed to he needs continued  mental health tx and stop using THC    Current psychiatric /substance abuse providers and contact info: None    Previous psychiatric/substance abuse providers and response to treatment: Pt.'s first psych hospitalization was @ [facility] appropriately one yr ago for psychosis (?) He was using THC and after short period of tx ; he was released with follow up out pt psych tx and medications. Pt stopped taking meds and he did not follow up with out pt tx 2nd adms was to [facility] 2019 ( one month) D/C with Meds and F/U (?)     Family history of mental illness or substance abuse:     Substance abuse history:  Pt is using THC and ETOH on a regular basis UDS was positive for [facility]  Social History     Tobacco Use    Smoking status: Current Every Day Smoker     Packs/day: 0.25    Smokeless tobacco: Never Used   Substance Use Topics    Alcohol use: Yes     Alcohol/week: 3.0 standard drinks     Types: 3 Shots of liquor per week       History of biomedical complications associated with substance abuse :    Patient's current acceptance of treatment or motivation for change:    Family constellation:  Farther Marlys Celis - Not close) Clayton Orellana - Mother - Pantera Cotter - Grandmother & 21 yr old Bro     Is significant other involved? N/A    Describe support system: Pt.'s mother Low Luray - Mother ) and Luna Cisneros- Mother [phone] ) provide his greatest support    Describe living arrangements and home environment: pt is single, has no children and he ws living alone in  [place]. Pt new living arrangement is currently homeless.      Health issues: Review H&P  Hospital Problems  Date Reviewed: 6/13/2019          Codes Class Noted POA    [facility] ([facility] ICD-10-CM: F20.9  ICD-9-CM: 295.90  7/17/2020 Unknown              Trauma history: Unk     Legal issues:2x [facility] [place] Failure to [employer] in [employer] ( 30 days) 2nd offense 45 days     History of  service: N/A    Financial status: None- receives financial support from his family    Spiritism/cultural factors: Episcopalian - Regular Attendance w/ G Mother     Education/work history: [employer] - Previously worked - currently unemployed    Have you been licensed as a health care professional (current or ):   No     Leisure and recreation preferences: Unk    Describe coping skills:  Ineffective and poor judgement    Ilya Funanga  2020

## 2020-07-18 NOTE — PROGRESS NOTES
Chief Complaint:  Patient refused to meet with treatment team today  Length of Stay: 1 Days    Interval History:  Patient refused to meet with treatment team this morning and would not pull the covers down from his head. No distress noted, chest rising and falling. No aggression noted at this time. Slept 8 hours. Will reevaluate in the morning. Past Medical History:  No past medical history on file. Labs:  Lab Results   Component Value Date/Time    WBC 13.1 (H) 07/17/2020 01:19 PM    HGB 13.5 07/17/2020 01:19 PM    HCT 38.8 07/17/2020 01:19 PM    PLATELET 580 15/93/6418 01:19 PM    MCV 86.2 07/17/2020 01:19 PM      Lab Results   Component Value Date/Time    Sodium 140 07/17/2020 01:19 PM    Potassium 3.4 (L) 07/17/2020 01:19 PM    Chloride 104 07/17/2020 01:19 PM    CO2 28 07/17/2020 01:19 PM    Anion gap 8 07/17/2020 01:19 PM    Glucose 111 (H) 07/17/2020 01:19 PM    BUN 10 07/17/2020 01:19 PM    Creatinine 1.06 07/17/2020 01:19 PM    BUN/Creatinine ratio 9 (L) 07/17/2020 01:19 PM    GFR est AA >60 07/17/2020 01:19 PM    GFR est non-AA >60 07/17/2020 01:19 PM    Calcium 9.0 07/17/2020 01:19 PM    Bilirubin, total 0.4 07/17/2020 01:19 PM    Alk.  phosphatase 76 07/17/2020 01:19 PM    Protein, total 7.9 07/17/2020 01:19 PM    Albumin 3.9 07/17/2020 01:19 PM    Globulin 4.0 07/17/2020 01:19 PM    A-G Ratio 1.0 (L) 07/17/2020 01:19 PM    ALT (SGPT) 27 07/17/2020 01:19 PM      Vitals:    07/17/20 2100 07/17/20 2247 07/18/20 0914   BP: 118/87  123/78   Pulse: 76  81   Resp: 18  14   Temp: 97.9 °F (36.6 °C)  98.2 °F (36.8 °C)   SpO2: 97%  98%   Weight:  95.3 kg (210 lb)    Height:  5' 11\" (1.803 m)          Current Facility-Administered Medications   Medication Dose Route Frequency Provider Last Rate Last Dose    OLANZapine (ZyPREXA) tablet 5 mg  5 mg Oral Q6H PRN Mitchael Opal' V, FNP        haloperidol lactate (HALDOL) injection 5 mg  5 mg IntraMUSCular Q6H PRN Mitchael Opal' V, FNP   5 mg at 07/17/20 2110    benztropine (COGENTIN) tablet 1 mg  1 mg Oral BID PRN Funmilayo Artist' V, FNP        diphenhydrAMINE (BENADRYL) injection 50 mg  50 mg IntraMUSCular BID PRN Vishal Joseph NP   50 mg at 07/17/20 2120    hydrOXYzine HCL (ATARAX) tablet 50 mg  50 mg Oral TID PRN Funmilayo Dutta' Clent Ask, FNP        LORazepam (ATIVAN) injection 1 mg  1 mg IntraMUSCular Q4H PRN Funmilayo Artist' V, FNP   1 mg at 07/17/20 2110    traZODone (DESYREL) tablet 50 mg  50 mg Oral QHS PRN Funmilayo Powersist' Clent Ask, FNP        acetaminophen (TYLENOL) tablet 650 mg  650 mg Oral Q4H PRN Funmilayo Powersist' V, FNP        magnesium hydroxide (MILK OF MAGNESIA) 400 mg/5 mL oral suspension 30 mL  30 mL Oral DAILY PRN Rhonda Epps, FNP             Mental Status Exam:  Eye contact: Poor  Grooming: poor  Psychomotor activity: calm  Speech is spontaneous  Mood is jesenia  Affect:jesenia  Perception:jesenia  Suicidal ideation: Heraclio Maxwell  Cognition is impaired          Physical Exam:  Body habitus: Body mass index is 29.29 kg/m². Musculoskeletal system: normal gait  Tremor - neg  Cog wheeling - neg      Assessment and Plan:  Meliton Birmingham meets criteria for a diagnosis of Schizophrenia  Continue the medication regimen as prescribed  Disposition planning to continue. I certify that this patients inpatient psychiatric hospital services furnished since the previous certification were, and continue to be, required for treatment that could reasonably be expected to improve the patient's condition, or for diagnostic study, and that the patient continues to need, on a daily basis, active treatment furnished directly by or requiring the supervision of inpatient psychiatric facility personnel. In addition, the hospital records show that services furnished were intensive treatment services, admission or related services, or equivalent services.

## 2020-07-18 NOTE — BH NOTES
@1703 Pt up out of room, given dinner tray. @1729 Pt visible in dining room. Actively responding to internal stimuli. Peering out of acute unit doors to general unit. RN offers PO medication, pt refuses.

## 2020-07-18 NOTE — PROGRESS NOTES
Problem: Altered Thought Process (Adult/Pediatric)  Goal: *STG: Participates in treatment plan  Outcome: Not Progressing Towards Goal     Problem: Altered Thought Process (Adult/Pediatric)  Goal: *STG: Attends activities and groups  7/18/2020 1553 by Geovanny Soler  Outcome: Not Progressing Towards Goal     Problem: Altered Thought Process (Adult/Pediatric)  Goal: *STG: Decreased hallucinations  Outcome: Not Progressing Towards Goal     Problem: Altered Thought Process (Adult/Pediatric)  Goal: Interventions  7/18/2020 1553 by Geovanny Soler  Outcome: Progressing Towards Goal  Note: Guarded distracted. Not engaged. pt seen by hospitalist for H&P. Pt refuses VS and dinner. Pt requests \"percocet\". Upon further evaluation pt motions his finger to his head during pain assessment. Tylenol offered to pt. Pt denies racing thoughts, AVH, paranoid thoughts, anxiety, SI or HI.
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
## 2020-07-18 NOTE — PROGRESS NOTES
Pt appears to be sleeping. NAD. Respirations even and unlabored. Will continue to monitor q15 for safety. Problem: Falls - Risk of  Goal: *Absence of Falls  Description: Document Fe Thompson Fall Risk and appropriate interventions in the flowsheet.   Outcome: Progressing Towards Goal  Note: Fall Risk Interventions:            Medication Interventions: Teach patient to arise slowly

## 2020-07-18 NOTE — PROGRESS NOTES
Patient requests large bandaid and vaseline for bottom of foot. Patient has broken blister bottom of right foot.  [facility] [place] [facility]\"

## 2020-07-18 NOTE — PROGRESS NOTES
TDO Hearing with Konstantin is scheduled for Monday 7/20/2020 at 0815. Problem: Altered Thought Process (Adult/Pediatric)  Goal: *STG: Complies with medication therapy  Outcome: Not Progressing Towards Goal     Problem: Altered Thought Process (Adult/Pediatric)  Goal: *STG: Attends activities and groups  Outcome: Not Progressing Towards Goal     Problem: Altered Thought Process (Adult/Pediatric)  Goal: *STG: Decreased delusional thinking  Outcome: Not Progressing Towards Goal     Problem: Altered Thought Process (Adult/Pediatric)  Goal: Interventions  Outcome: Progressing Towards Goal  Note: Pt is TDO. Pt is resting in bed. NAD. Respirations regular and unlabored. Initial treatment team scheduled for today. Pt remains in bed. Grunts during treatment team. Not engaged. Pt did not participate. 1240- pt remains isolative to his room. Pt did not eat breakfast. Lunch is held for pt.   1330- pt is visible on the unit requesting a shower and allergy medication. Shower provided. 1350- pt eating lunch late. Paranoid. Guarded. Impulsive.      [facility] 60  Master Treatment Plan for Foreign Rousseau    Date Treatment Plan Initiated: 7/18/2020    Treatment Plan Modalities:  Type of Modality Amount  (x minutes) Frequency (x/week) Duration (x days) Name of Responsible Staff   [address] [facility] meetings to encourage peer interactions 15 7 1   Bridger CARIAS    Group psychotherapy to assist in building coping skills and internal controls 60 7 1 Jay Melo   Therapeutic activity groups to build coping skills 60 7 1 Jay Melo   Psychoeducation in group setting to address:   Medication education   15 [phone] [facility] skills         Relaxation techniques         Symptom management         Discharge planning   60 2 255 [facility]    60 2 1 [facility] 1 1 volunteer   Recovery/AA/NA      volunteer   Physician medication management   15 7 1 Dr Esposito/ Dena Patterson NP/ Aydee NP   Family meeting/discharge planning   15 2 1 Sonia Montenegro and Angela Dodge

## 2020-07-19 PROCEDURE — 74011000250 HC RX REV CODE- 250: Performed by: NURSE PRACTITIONER

## 2020-07-19 PROCEDURE — 74011250636 HC RX REV CODE- 250/636: Performed by: NURSE PRACTITIONER

## 2020-07-19 PROCEDURE — 74011250637 HC RX REV CODE- 250/637: Performed by: NURSE PRACTITIONER

## 2020-07-19 PROCEDURE — 65220000003 HC RM SEMIPRIVATE PSYCH

## 2020-07-19 RX ORDER — DM/P-EPHED/ACETAMINOPH/DOXYLAM 30-7.5/3
2 LIQUID (ML) ORAL
Status: DISCONTINUED | OUTPATIENT
Start: 2020-07-19 | End: 2020-07-30 | Stop reason: HOSPADM

## 2020-07-19 RX ORDER — OLANZAPINE 5 MG/1
5 TABLET ORAL
Status: DISCONTINUED | OUTPATIENT
Start: 2020-07-19 | End: 2020-07-20

## 2020-07-19 RX ADMIN — HALOPERIDOL LACTATE 5 MG: 5 INJECTION, SOLUTION INTRAMUSCULAR at 20:37

## 2020-07-19 RX ADMIN — BACITRACIN ZINC NEOMYCIN SULFATE POLYMYXIN B SULFATE: 400; 3.5; 5 OINTMENT TOPICAL at 08:00

## 2020-07-19 RX ADMIN — DIPHENHYDRAMINE HYDROCHLORIDE 50 MG: 50 INJECTION, SOLUTION INTRAMUSCULAR; INTRAVENOUS at 21:02

## 2020-07-19 RX ADMIN — LORAZEPAM 1 MG: 2 INJECTION INTRAMUSCULAR; INTRAVENOUS at 20:37

## 2020-07-19 RX ADMIN — NICOTINE POLACRILEX 2 MG: 2 LOZENGE ORAL at 12:53

## 2020-07-19 RX ADMIN — BACITRACIN ZINC NEOMYCIN SULFATE POLYMYXIN B SULFATE: 400; 3.5; 5 OINTMENT TOPICAL at 17:04

## 2020-07-19 NOTE — PROGRESS NOTES
Chief Complaint:  \"Hello\"  Length of Stay: 2 Days    Interval History:  Patient out on milieu responding to himself but denies SI/HI/AVH at this time. Mood can be labile and today he seems paranoid at times during assessment. Patient looks around room when treatment team are talking with him. He denies having any issues at this time and states, \"I'm not sure why I am here. \" Patient is disorganized at times in his thinking. No aggression noted at this time. Past Medical History:  No past medical history on file. Labs:  Lab Results   Component Value Date/Time    WBC 13.1 (H) 07/17/2020 01:19 PM    HGB 13.5 07/17/2020 01:19 PM    HCT 38.8 07/17/2020 01:19 PM    PLATELET 737 60/19/6361 01:19 PM    MCV 86.2 07/17/2020 01:19 PM      Lab Results   Component Value Date/Time    Sodium 140 07/17/2020 01:19 PM    Potassium 3.4 (L) 07/17/2020 01:19 PM    Chloride 104 07/17/2020 01:19 PM    CO2 28 07/17/2020 01:19 PM    Anion gap 8 07/17/2020 01:19 PM    Glucose 111 (H) 07/17/2020 01:19 PM    BUN 10 07/17/2020 01:19 PM    Creatinine 1.06 07/17/2020 01:19 PM    BUN/Creatinine ratio 9 (L) 07/17/2020 01:19 PM    GFR est AA >60 07/17/2020 01:19 PM    GFR est non-AA >60 07/17/2020 01:19 PM    Calcium 9.0 07/17/2020 01:19 PM    Bilirubin, total 0.4 07/17/2020 01:19 PM    Alk.  phosphatase 76 07/17/2020 01:19 PM    Protein, total 7.9 07/17/2020 01:19 PM    Albumin 3.9 07/17/2020 01:19 PM    Globulin 4.0 07/17/2020 01:19 PM    A-G Ratio 1.0 (L) 07/17/2020 01:19 PM    ALT (SGPT) 27 07/17/2020 01:19 PM      Vitals:    07/18/20 1906 07/19/20 0742 07/19/20 0913 07/19/20 1101   BP: 137/89 124/81  139/80   Pulse: 92 69  82   Resp: 16 18  18   Temp: 98.6 °F (37 °C) 98.4 °F (36.9 °C)  97.3 °F (36.3 °C)   SpO2: 100% 99%  100%   Weight:   85.4 kg (188 lb 3.2 oz)    Height:             Current Facility-Administered Medications   Medication Dose Route Frequency Provider Last Rate Last Dose    OLANZapine (ZyPREXA) tablet 5 mg  5 mg Oral Parul Vazquez NP       Scarlett Lindsayon neomycin-bacitracin-polymyxin (NEOSPORIN) ointment   Topical BID Dana Peng, DIOGENES        loratadine (CLARITIN) tablet 10 mg  10 mg Oral DAILY PRN Dana Peng NP        OLANZapine (ZyPREXA) tablet 5 mg  5 mg Oral Q6H PRN Funmilayo Powersist' V, FNP        haloperidol lactate (HALDOL) injection 5 mg  5 mg IntraMUSCular Q6H PRN Funmilayo Powersist' V, FNP   5 mg at 07/17/20 2110    benztropine (COGENTIN) tablet 1 mg  1 mg Oral BID PRN Funmilayo Powersist' V, FNP        diphenhydrAMINE (BENADRYL) injection 50 mg  50 mg IntraMUSCular BID PRN Vishal Joseph NP   50 mg at 07/17/20 2120    hydrOXYzine HCL (ATARAX) tablet 50 mg  50 mg Oral TID PRN Funmilayo Dutta' Clent Ask, FNP        LORazepam (ATIVAN) injection 1 mg  1 mg IntraMUSCular Q4H PRN Funmilayo Powersist' V, FNP   1 mg at 07/17/20 2110    traZODone (DESYREL) tablet 50 mg  50 mg Oral QHS PRN Funmilayo Dutta' Clent Ask, FNP        acetaminophen (TYLENOL) tablet 650 mg  650 mg Oral Q4H PRN Funmilayo Dutta' V, FNP        magnesium hydroxide (MILK OF MAGNESIA) 400 mg/5 mL oral suspension 30 mL  30 mL Oral DAILY PRN Funmilayo Dutta' Clent Ask, FNP             Mental Status Exam:  Eye contact: Poor  Grooming: poor  Psychomotor activity: calm  Speech is spontaneous  Mood is constricted  Affect:paranoid  Perception:+ah  Suicidal ideation: denies si  Cognition is impaired          Physical Exam:  Body habitus: Body mass index is 26.25 kg/m². Musculoskeletal system: normal gait  Tremor - neg  Cog wheeling - neg      Assessment and Plan:  Meliton Birmingham meets criteria for a diagnosis of Schizophrenia  Start Zyprexa 5mg q hs. Continue the medication regimen as prescribed  Disposition planning to continue.    I certify that this patients inpatient psychiatric hospital services furnished since the previous certification were, and continue to be, required for treatment that could reasonably be expected to improve the patient's condition, or for diagnostic study, and that the patient continues to need, on a daily basis, active treatment furnished directly by or requiring the supervision of inpatient psychiatric facility personnel. In addition, the hospital records show that services furnished were intensive treatment services, admission or related services, or equivalent services.

## 2020-07-19 NOTE — PROGRESS NOTES
Problem: Altered Thought Process (Adult/Pediatric)  Goal: *STG: Participates in treatment plan  7/19/2020 1532 by Frank Steel  Outcome: Progressing Towards Goal     Problem: Altered Thought Process (Adult/Pediatric)  Goal: *STG: Attends activities and groups  7/19/2020 1532 by Frank Steel  Outcome: Progressing Towards Goal     Problem: Altered Thought Process (Adult/Pediatric)  Goal: Interventions  7/19/2020 1532 by Frank Steel  Outcome: Progressing Towards Goal  Note: Alert and oriented. Animated. Sexually preoccupied. Pt is restless. Walking singing. 1845- pt, \"see my fingers. They are shaped like guns see. Listen to that sound. Do you hear it? Where does all that power go?\"    1923-Pt asking to talk to President Felicia Denney. Pt focused on Angela's wife.

## 2020-07-19 NOTE — INTERDISCIPLINARY ROUNDS
Behavioral Health Interdisciplinary Rounds     Patient Name: Shiva Rneteria  Age: 21 y.o. Room/Bed:  734/  Primary Diagnosis: <principal problem not specified>   Admission Status: TDO     Readmission within 30 days: no  Power of  in place: no  Patient requires a blocked bed: no          Reason for blocked bed:     VTE Prophylaxis: No    Mobility needs/Fall risk: no  Flu Vaccine : no   Nutritional Plan: no  Consults:          Labs/Testing due today?: no    Sleep hours:  8      Participation in Care/Groups:  yes  Medication Compliant?: Refusing Psychiatric Medications  PRNS (last 24 hours): None    Restraints (last 24 hours):  no     CIWA (range last 24 hours):     COWS (range last 24 hours):      Alcohol screening (AUDIT) completed -   AUDIT Score: 0     If applicable, date SBIRT discussed in treatment team AND documented:   AUDIT Screen Score: AUDIT Score: 0      Document Brief Intervention (corresponds directly with the 5 A's, Ask, Advise, Assess, Assist, and Arrange): At- Risk Patients (Score 7-15 for women; 8-15 for men)  Discuss concern patient is drinking at unhealthy levels known to increase risk of alcohol-related health problems. Is Patient ready to commit to change? If No:   Encourage reflection   Discuss short term and long term health risks of consuming alcohol   Barriers to change   Reaffirm willingness to help / Educational materials provided  If Yes:   Set goal  [facility] provided    Harmful use or Dependence (Score 16 or greater)   Discuss short term and long term health risks of consuming alcohol   Recommendations   Negotiate drinking goal   Recommend addiction specialist/center   Arrange follow-up appointments.     Tobacco - patient is a smoker: Have You Used Tobacco in the Past 30 Days: No  Illegal Drugs use: Have You Used Any Illegal Substances Over the Past 12 Months: No    24 hour chart check complete: yes     Patient goal(s) for today: Treatment team focus/goals:   Progress note     LOS:  2  Expected LOS:     Financial concerns/prescription coverage:    Family contact:       Family requesting physician contact today:    Discharge plan:   Access to weapons :         Outpatient provider(s):   Patient's preferred phone number for follow up call :     Participating treatment team members: Zarina Mahtis, * (assigned SW),

## 2020-07-19 NOTE — PROGRESS NOTES
Pt appears to be sleeping. No apparent distress. Respirations WNL. Will continue to monitor q15 for safety. Problem: Falls - Risk of  Goal: *Absence of Falls  Description: Document Lavell Alex Fall Risk and appropriate interventions in the flowsheet.   Outcome: Progressing Towards Goal  Note: Fall Risk Interventions:            Medication Interventions: Teach patient to arise slowly

## 2020-07-19 NOTE — PROGRESS NOTES
Pt signs JAN for mother Nanette Galvez [phone]      Problem: Altered Thought Process (Adult/Pediatric)  Goal: *STG: Participates in treatment plan  Outcome: Progressing Towards Goal     Problem: Altered Thought Process (Adult/Pediatric)  Goal: *STG: Complies with medication therapy  Outcome: Not Progressing Towards Goal     Problem: Altered Thought Process (Adult/Pediatric)  Goal: *STG: Attends activities and groups  Outcome: Not Progressing Towards Goal     Problem: Altered Thought Process (Adult/Pediatric)  Goal: Interventions  Outcome: Progressing Towards Goal  Note: Pt is alert showered. Eating breakfast with peers. Calm. Appears distracted. Denies AVH. Denies SI/HI. Talking during treatment team. Reports adequate sleep. Alert and oriented. pt is active and engaged on the unit. Pt watches staff. Picks up soiled linen bag caring it around the unit. Education provided. Pt placed bag down. Pt begins throwing balls of paper around the dining room with male peer. Eduction provided. Pt stops throwing objects. Appears to be testing boundaries with staff. Education provided. Coping skills encouraged. therapeutic communication provided. 2646- pt runs in [place]. Education provided. Pt stops running. 6876- periodically mumbles sexually inappropriate statement related to females. Dancing in the [place]. 9264- pt is loud cursing talking to male peers. Education provided. Redirection provided. 1014- pt is animated talking to peers. 1050- pt requests staff to remove string from pants so he can ware pants on unit. Pt disrobes on the unit at the nurses station. Education provided related to appropriate dress on the unit. Pt response \"sorry\" male peer name \" I tried\". \"they act like I don't have one. \"     Pt appears sexually preoccupied.

## 2020-07-20 PROCEDURE — 65220000003 HC RM SEMIPRIVATE PSYCH

## 2020-07-20 PROCEDURE — 74011250637 HC RX REV CODE- 250/637: Performed by: NURSE PRACTITIONER

## 2020-07-20 RX ORDER — OLANZAPINE 5 MG/1
5 TABLET ORAL 2 TIMES DAILY
Status: DISCONTINUED | OUTPATIENT
Start: 2020-07-20 | End: 2020-07-21

## 2020-07-20 RX ADMIN — OLANZAPINE 5 MG: 5 TABLET, FILM COATED ORAL at 21:55

## 2020-07-20 RX ADMIN — NICOTINE POLACRILEX 2 MG: 2 LOZENGE ORAL at 16:51

## 2020-07-20 RX ADMIN — HYDROXYZINE HYDROCHLORIDE 50 MG: 50 TABLET ORAL at 16:18

## 2020-07-20 RX ADMIN — BACITRACIN ZINC NEOMYCIN SULFATE POLYMYXIN B SULFATE: 400; 3.5; 5 OINTMENT TOPICAL at 09:16

## 2020-07-20 RX ADMIN — OLANZAPINE 5 MG: 5 TABLET, FILM COATED ORAL at 19:32

## 2020-07-20 RX ADMIN — OLANZAPINE 5 MG: 5 TABLET, FILM COATED ORAL at 11:25

## 2020-07-20 RX ADMIN — BACITRACIN ZINC NEOMYCIN SULFATE POLYMYXIN B SULFATE: 400; 3.5; 5 OINTMENT TOPICAL at 21:58

## 2020-07-20 NOTE — PROGRESS NOTES
Problem: Falls - Risk of  Goal: *Absence of Falls  Description: Document Juan Francisco Kang Fall Risk and appropriate interventions in the flowsheet. Outcome: Progressing Towards Goal  Note: Fall Risk Interventions:            Medication Interventions: Teach patient to arise slowly    [id number]: Patient awake and alert and present in the milieu eating breakfast. Mood and affect; labile, anxious, dull and flat. Denies SI/HI. Will continue to monitor q15 minutes for safety checks.

## 2020-07-20 NOTE — BH NOTES
PRN Medication Documentation    Specific patient behavior that led to need for PRN medication:c/o anxiety,pt request Staff interventions attempted prior to PRN being given:coping skills  PRN medication given:atarax Patient response/effectiveness of PRN medication: tl aware

## 2020-07-20 NOTE — INTERDISCIPLINARY ROUNDS
Behavioral Health Interdisciplinary Rounds     Patient Name: Colby Villalobos  Age: 21 y.o. Room/Bed:  734/02  Primary Diagnosis: <principal problem not specified>   Admission Status: TDO     Readmission within 30 days: no  Power of  in place: no  Patient requires a blocked bed: no          Reason for blocked bed:     VTE Prophylaxis: No    Mobility needs/Fall risk: no  Flu Vaccine : no   Nutritional Plan:   Consults:          Labs/Testing due today?: no    Sleep hours:  7      Participation in Care/Groups:  yes  Medication Compliant?: Yes  PRNS (last 24 hours): Antipsychotic (IM), Antianxiety and Anticholinergic    Restraints (last 24 hours):  no     CIWA (range last 24 hours):     COWS (range last 24 hours):      Alcohol screening (AUDIT) completed -   AUDIT Score: 0     If applicable, date SBIRT discussed in treatment team AND documented:   AUDIT Screen Score: AUDIT Score: 0    Tobacco - patient is a smoker: Have You Used Tobacco in the Past 30 Days: No  Illegal Drugs use: Have You Used Any Illegal Substances Over the Past 12 Months: No    24 hour chart check complete: yes     Patient goal(s) for today: take medications, attend groups  Treatment team focus/goals: titrate medications, increase zyprexa (5mg) and coordinate discharge  Progress note: Pt is alert, oriented, clam and engaged with treatment. He thinks he is here for a blister despite having a TDO  hearing today. He opened up about experiences AH \"voices outside tell him how much they miss me and that I need to get on my feet and get a job, house, car, women, party\". His thought process is tangential and does not make sense.      LOS:  3  Expected LOS: TBD    Financial concerns/prescription coverage:  uninsured  Family contact:  Khang Disla - Paternal  [address] Mother [phone] &Kaley Huff Mother - [phone]  Family requesting physician contact today:    Discharge plan: HOMELESS  Access to weapons :  NA      Outpatient provider(s): to be linked  Patient's preferred phone number for follow up call :     Participating treatment team members: Shantell Bray NP; Michelle Loo RN; José Monique
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
## 2020-07-20 NOTE — BH NOTES
PRN Medication Documentation    Specific patient behavior that led to need for PRN medication: possible acute dystonia reaction  Staff interventions attempted prior to PRN being given: observance  PRN medication given: Benedryl  Patient response/effectiveness of PRN medication: Will continue to monitor

## 2020-07-20 NOTE — PROGRESS NOTES
Pt appears to be sleeping. No apparent distress. Respirations WNL. Will continue to monitor q15 for safety. Problem: Falls - Risk of  Goal: *Absence of Falls  Description: Document Chapincito Michele Fall Risk and appropriate interventions in the flowsheet.   Outcome: Progressing Towards Goal  Note: Fall Risk Interventions:            Medication Interventions: Teach patient to arise slowly

## 2020-07-20 NOTE — BH NOTES
2035:  PRN Medication Documentation    Specific patient behavior that led to need for PRN medication: Altercation with another patient, agitated, irritable, yelling  Staff interventions attempted prior to PRN being given: redirection, deescalate situation  PRN medication given: Haldol 5mg IM and Ativan 1mg IM  Patient response/effectiveness of PRN medication: 2145: pt appears to be sleeping
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
## 2020-07-20 NOTE — PROGRESS NOTES
Problem: Altered Thought Process (Adult/Pediatric)  Goal: *STG: Participates in treatment plan  Outcome: Progressing Towards Goal  Goal: *STG: Complies with medication therapy  Outcome: Progressing Towards Goal  Goal: *STG: Attends activities and groups  Outcome: Progressing Towards Goal  Goal: *STG: Decreased delusional thinking  Outcome: Progressing Towards Goal  Goal: *STG: Decreased hallucinations  Outcome: Progressing Towards Goal  Goal: Interventions  Outcome: Progressing Towards Goal   Pt is engage on unit  At present pt is cooperative and following redirections

## 2020-07-20 NOTE — PROGRESS NOTES
Problem: Altered Thought Process (Adult/Pediatric)  Goal: *STG: Participates in treatment plan  Outcome: Progressing Towards Goal  Note: Patient participatory in treatment and involved in hearing process with no signs of agitation or irritability. Problem: Altered Thought Process (Adult/Pediatric)  Goal: *STG: Complies with medication therapy  7/20/2020 0921 by Baldemar Lynn  Outcome: Progressing Towards Goal  Note: Complaint with medication regimen, and accepting of medication education.    7/20/2020 0904 by Baldemar Lynn  Outcome: Progressing Towards Goal

## 2020-07-20 NOTE — PROGRESS NOTES
Chief Complaint:  I am having a bad time today. Length of Stay: 3 Days    Interval History:  Jennifer Celis states he is having a bad day today but couldn't further elaborate. He is pleasantly psychotic. grandiose. He says he is here in the hospital because he has a blister on his foot. He got committed from his tdo hearing this morning. He shares that he is hearing voices, \"the people who are outside they're telling me how much they missed me, that I need to hurry up and get cars, women, do party. \" \"I am the type of man who can get all these things\". He is convinced that people are laughing about him. \"All I need is gold teeth. They like me better if I have gold teeth\". He denies si or hi. He is tolerating his meds and denies side effects. Past Medical History:  No past medical history on file. Labs:  Lab Results   Component Value Date/Time    WBC 13.1 (H) 07/17/2020 01:19 PM    HGB 13.5 07/17/2020 01:19 PM    HCT 38.8 07/17/2020 01:19 PM    PLATELET 607 09/38/0582 01:19 PM    MCV 86.2 07/17/2020 01:19 PM      Lab Results   Component Value Date/Time    Sodium 140 07/17/2020 01:19 PM    Potassium 3.4 (L) 07/17/2020 01:19 PM    Chloride 104 07/17/2020 01:19 PM    CO2 28 07/17/2020 01:19 PM    Anion gap 8 07/17/2020 01:19 PM    Glucose 111 (H) 07/17/2020 01:19 PM    BUN 10 07/17/2020 01:19 PM    Creatinine 1.06 07/17/2020 01:19 PM    BUN/Creatinine ratio 9 (L) 07/17/2020 01:19 PM    GFR est AA >60 07/17/2020 01:19 PM    GFR est non-AA >60 07/17/2020 01:19 PM    Calcium 9.0 07/17/2020 01:19 PM    Bilirubin, total 0.4 07/17/2020 01:19 PM    Alk.  phosphatase 76 07/17/2020 01:19 PM    Protein, total 7.9 07/17/2020 01:19 PM    Albumin 3.9 07/17/2020 01:19 PM    Globulin 4.0 07/17/2020 01:19 PM    A-G Ratio 1.0 (L) 07/17/2020 01:19 PM    ALT (SGPT) 27 07/17/2020 01:19 PM      Vitals:    07/19/20 0913 07/19/20 1101 07/19/20 1609 07/20/20 0802   BP:  139/80 98/65 129/77   Pulse:  82 82 77   Resp:  18 14 16 Temp:  97.3 °F (36.3 °C) 97.7 °F (36.5 °C) 97.9 °F (36.6 °C)   SpO2:  100% 100% 100%   Weight: 85.4 kg (188 lb 3.2 oz)      Height:             Current Facility-Administered Medications   Medication Dose Route Frequency Provider Last Rate Last Dose    OLANZapine (ZyPREXA) tablet 5 mg  5 mg Oral QHS Atiya Jennings, NP   Stopped at 07/19/20 2200    nicotine buccal (POLACRILEX) lozenge 2 mg  2 mg Oral Q2H PRN Atiya Jennings, NP   2 mg at 07/19/20 1253    neomycin-bacitracin-polymyxin (NEOSPORIN) ointment   Topical BID Harding Octavia, NP        loratadine (CLARITIN) tablet 10 mg  10 mg Oral DAILY PRN Harding Yonatan, NP        OLANZapine (ZyPREXA) tablet 5 mg  5 mg Oral Q6H PRN Pan Trinh V, FNP        haloperidol lactate (HALDOL) injection 5 mg  5 mg IntraMUSCular Q6H PRN Pan Trinh V, FNP   5 mg at 07/19/20 2037    benztropine (COGENTIN) tablet 1 mg  1 mg Oral BID PRN Pan Trinh V, FNP        diphenhydrAMINE (BENADRYL) injection 50 mg  50 mg IntraMUSCular BID PRN Atiya Jennings NP   50 mg at 07/19/20 2102    hydrOXYzine HCL (ATARAX) tablet 50 mg  50 mg Oral TID PRN Pan Santos, FNP        LORazepam (ATIVAN) injection 1 mg  1 mg IntraMUSCular Q4H PRN Pan Trinh V, FNP   1 mg at 07/19/20 2037    traZODone (DESYREL) tablet 50 mg  50 mg Oral QHS PRN Pan Santos, FNP        acetaminophen (TYLENOL) tablet 650 mg  650 mg Oral Q4H PRN Pan Trinh V, FNP        magnesium hydroxide (MILK OF MAGNESIA) 400 mg/5 mL oral suspension 30 mL  30 mL Oral DAILY PRN Pan Santos, FNP             Mental Status Exam:  Eye contact: Poor  Grooming: poor  Psychomotor activity: calm  Speech is spontaneous  Mood is constricted  Affect:paranoid  Perception:+ah  Suicidal ideation: denies si  Cognition is impaired          Physical Exam:  Body habitus: Body mass index is 26.25 kg/m².   Musculoskeletal system: normal gait  Tremor - neg  Cog wheeling - neg      Assessment and Plan:  Jared Bai Ronak meets criteria for a diagnosis of Schizophrenia    Increase zyprexa to 10mg bid. .  Continue the medication regimen as prescribed  Disposition planning to continue. I certify that this patients inpatient psychiatric hospital services furnished since the previous certification were, and continue to be, required for treatment that could reasonably be expected to improve the patient's condition, or for diagnostic study, and that the patient continues to need, on a daily basis, active treatment furnished directly by or requiring the supervision of inpatient psychiatric facility personnel. In addition, the hospital records show that services furnished were intensive treatment services, admission or related services, or equivalent services.

## 2020-07-21 PROCEDURE — 74011250637 HC RX REV CODE- 250/637: Performed by: NURSE PRACTITIONER

## 2020-07-21 PROCEDURE — 87635 SARS-COV-2 COVID-19 AMP PRB: CPT

## 2020-07-21 PROCEDURE — 74011250636 HC RX REV CODE- 250/636: Performed by: NURSE PRACTITIONER

## 2020-07-21 PROCEDURE — 65220000003 HC RM SEMIPRIVATE PSYCH

## 2020-07-21 RX ORDER — DIPHENHYDRAMINE HYDROCHLORIDE 50 MG/ML
50 INJECTION, SOLUTION INTRAMUSCULAR; INTRAVENOUS
Status: DISCONTINUED | OUTPATIENT
Start: 2020-07-21 | End: 2020-07-27

## 2020-07-21 RX ORDER — LORAZEPAM 2 MG/ML
2 INJECTION INTRAMUSCULAR
Status: DISCONTINUED | OUTPATIENT
Start: 2020-07-21 | End: 2020-07-30 | Stop reason: HOSPADM

## 2020-07-21 RX ORDER — DIVALPROEX SODIUM 500 MG/1
1000 TABLET, EXTENDED RELEASE ORAL
Status: DISCONTINUED | OUTPATIENT
Start: 2020-07-21 | End: 2020-07-25

## 2020-07-21 RX ORDER — OLANZAPINE 5 MG/1
5 TABLET ORAL 3 TIMES DAILY
Status: DISCONTINUED | OUTPATIENT
Start: 2020-07-21 | End: 2020-07-22

## 2020-07-21 RX ADMIN — BACITRACIN ZINC NEOMYCIN SULFATE POLYMYXIN B SULFATE: 400; 3.5; 5 OINTMENT TOPICAL at 08:38

## 2020-07-21 RX ADMIN — OLANZAPINE 5 MG: 5 TABLET, FILM COATED ORAL at 17:10

## 2020-07-21 RX ADMIN — DIVALPROEX SODIUM 1000 MG: 500 TABLET, EXTENDED RELEASE ORAL at 20:40

## 2020-07-21 RX ADMIN — HYDROXYZINE HYDROCHLORIDE 50 MG: 50 TABLET ORAL at 17:10

## 2020-07-21 RX ADMIN — LORAZEPAM 2 MG: 2 INJECTION INTRAMUSCULAR; INTRAVENOUS at 10:25

## 2020-07-21 RX ADMIN — OLANZAPINE 5 MG: 5 TABLET, FILM COATED ORAL at 08:37

## 2020-07-21 RX ADMIN — NICOTINE POLACRILEX 2 MG: 2 LOZENGE ORAL at 14:47

## 2020-07-21 RX ADMIN — OLANZAPINE 5 MG: 5 TABLET, FILM COATED ORAL at 16:24

## 2020-07-21 RX ADMIN — BACITRACIN ZINC NEOMYCIN SULFATE POLYMYXIN B SULFATE: 400; 3.5; 5 OINTMENT TOPICAL at 16:39

## 2020-07-21 RX ADMIN — HALOPERIDOL LACTATE 5 MG: 5 INJECTION, SOLUTION INTRAMUSCULAR at 11:02

## 2020-07-21 RX ADMIN — OLANZAPINE 5 MG: 5 TABLET, FILM COATED ORAL at 20:40

## 2020-07-21 RX ADMIN — DIPHENHYDRAMINE HYDROCHLORIDE 50 MG: 50 INJECTION, SOLUTION INTRAMUSCULAR; INTRAVENOUS at 10:25

## 2020-07-21 NOTE — PROGRESS NOTES
Admission Medication Reconciliation:    Information obtained from:  [facility], [facility]   RxQuery data available¹:  NO    Comments/Recommendations: Updated PTA meds/reviewed patient's allergies. 1)  Patient denies taking any medications prior to admission - just \"weed. \" Limited interaction possible due to agitation. 2)  Medication changes (since last review):  Removed  - ergocalciferol  - naproxen    3)  The [facility] Prescription Monitoring Program () was assessed to determine fill history of any controlled medications. Unable to locate the patient in the database. ¹RxQuery pharmacy benefit data reflects medications filled and processed through the patient's insurance, however   this data does NOT capture whether the medication was picked up or is currently being taken by the patient. Allergies:  Patient has no known allergies. Significant PMH/Disease States: No past medical history on file. Chief Complaint for this Admission:  No chief complaint on file.     Prior to Admission Medications:   None     LINDA Osorio

## 2020-07-21 NOTE — PROGRESS NOTES
Problem: Altered Thought Process (Adult/Pediatric)  Goal: *STG: Participates in treatment plan  Outcome: Not Progressing Towards Goal  Goal: *STG: Attends activities and groups  Outcome: Not Progressing Towards Goal  Goal: *STG: Decreased hallucinations  Outcome: Not Progressing Towards Goal    Patient was irritable throughout morning prior to treatment team. Threatening that he \"will go home\" Present in dayroom for meals and brief interaction with staff. Appears to respond to internal stimuli. Staff focus on calming techniques, redirection.

## 2020-07-21 NOTE — BH NOTES
PRN Medication Documentation    Specific patient behavior that led to need for PRN medication: agitation verbally threatening staff,pacing  Staff interventions attempted prior to PRN being given: redirection,po medication prn refused  PRN medication given: IM ATIVAN/HALDOL/BENEDRYALPatient response/effectiveness of PRN medication: 1 [facility]

## 2020-07-21 NOTE — BH NOTES
5  Pt is agitated, knocking on the nurses station window, responding to internal stimuli, and using profane language in public areas. Pt is not agreeable to verbal prompts to be redirected and continues to escalate. Staff asks if he needs medication to assist him in de-escalating. Pt smiles and states, \"I need a P-30! \"   Pt accepts Zyprexa 5 mg and Vistaril 50 mg.  1810  Pt is falling asleep on the sofa in the dayroom. Staff asks pt to rest in his room. Pt attempts to engage staff in argument and demands to leave. Staff advises pt that following directions is going to be a gauge of readiness for release from the doctor. Pt gets on his knee and raises his arms in the air in a gesture to pray, and exclaims, \"Yes Lord! Yes Lord!\", and goes to his room.

## 2020-07-21 NOTE — GROUP NOTE
[facility] GROUP DOCUMENTATION INDIVIDUAL                                                                          Group Therapy Note    Date: 7/20/2020    Group Start Time: 2040  Group End Time: 2110  Group Topic: Reflection/Relaxation    [address] ACUTE BEHA HLUMBERTO Kumar, [address] [facility]    IP [address] [facility] GROUP DOCUMENTATION GROUP    Group Therapy Note    Attendees:          Attendance: Attended    Patient's Goal:  Unit orientation and daily progress    Interventions/techniques: Supported    Follows Directions: Followed directions    Interactions: Disorganized interaction    Mental Status: Delusions    Behavior/appearance: Cooperative    Goals Achieved: Able to engage in interactions and Able to listen to others      Additional Notes:  Stated he was going to \"hit on Al Pacino on [employer].\" Patient has been making off the wall comments throughout shift using profanity and had to be redirected multiple times. One episode of increased agitation and anger while on phone talking to his mother. Patient observed running in olvera once and exhibits other inappropriate behavior but is redirectable.      [facility] [address]

## 2020-07-21 NOTE — H&P
INITIAL PSYCHIATRIC INTERVIEW:      CHIEF COMPLAINT:  \"I am well\"      HISTORY OF PRESENTING COMPLAINT:  Kimberley Osman is a 21 y.o. BLACK OR  male who is currently admitted to the psychiatric floor at [facility]. Patient endorses he was brought in the hospital against his will and states, Andry Bateman all wanted me out and wanted me to burn outside my room. \" Patient endorses he was staying at a room and someone called the police on him. He endorses there was a crowd of people laughing at him and the laughs got louder and louder. Police were called and the patient got in front seat and refused to get out of the passenger side. He was an ECO and was brought into the hospital from [facility]. Patient is bizarre and paranoid during assessment. Mood can be labile at times and yesterday he refused to meet with treatment team. He denies SI/HI/AVH but was observed responding to internal stimuli. Patient is poor historian and can not remember why or how he ended up at a hotel room. Patient will be reevaluated Monday by treatment team.         PAST PSYCHIATRIC HISTORY and SUBSTANCE ABUSE HISTORY:  Denies any previous psychiatric hospitalizations. Endorses previous substance abuse of THC      PAST MEDICAL HISTORY:  Please see H&P for details. No past medical history on file. Prior to Admission medications    Medication Sig Start Date End Date Taking? Authorizing Provider   naproxen (NAPROSYN) 500 mg tablet TAKE 1 TABLET BY MOUTH EVERY DAY AS NEEDED HEADACHE 8/6/19   Rocio Wang NP   ergocalciferol (ERGOCALCIFEROL) 50,000 unit capsule Take 1 Cap by mouth every seven (7) days.  6/16/19   Rocio Wang NP         Lab Results   Component Value Date/Time    WBC 13.1 (H) 07/17/2020 01:19 PM    HGB 13.5 07/17/2020 01:19 PM    HCT 38.8 07/17/2020 01:19 PM    PLATELET 892 04/94/6785 01:19 PM    MCV 86.2 07/17/2020 01:19 PM      Lab Results   Component Value Date/Time    Sodium 140 07/17/2020 01:19 PM Potassium 3.4 (L) 07/17/2020 01:19 PM    Chloride 104 07/17/2020 01:19 PM    CO2 28 07/17/2020 01:19 PM    Anion gap 8 07/17/2020 01:19 PM    Glucose 111 (H) 07/17/2020 01:19 PM    BUN 10 07/17/2020 01:19 PM    Creatinine 1.06 07/17/2020 01:19 PM    BUN/Creatinine ratio 9 (L) 07/17/2020 01:19 PM    GFR est AA >60 07/17/2020 01:19 PM    GFR est non-AA >60 07/17/2020 01:19 PM    Calcium 9.0 07/17/2020 01:19 PM    Bilirubin, total 0.4 07/17/2020 01:19 PM    Alk. phosphatase 76 07/17/2020 01:19 PM    Protein, total 7.9 07/17/2020 01:19 PM    Albumin 3.9 07/17/2020 01:19 PM    Globulin 4.0 07/17/2020 01:19 PM    A-G Ratio 1.0 (L) 07/17/2020 01:19 PM    ALT (SGPT) 27 07/17/2020 01:19 PM      Vitals:    07/19/20 1609 07/20/20 0802 07/20/20 1552 07/20/20 1938   BP: 98/65 129/77 122/79 124/85   Pulse: 82 77 84 73   Resp: 14 16 16 16   Temp: 97.7 °F (36.5 °C) 97.9 °F (36.6 °C) 98.3 °F (36.8 °C)    SpO2: 100% 100% 100% 100%   Weight:       Height:             PSYCHOSOCIAL HISTORY:  Endorses he lives alone   No children      MENTAL STATUS EXAM:  General appearance: psychomotor activity is calm  Eye contact: Fair eye contact  Speech: Spontaneous  Affect : Constricted  Mood: Labile  Thought Process: Paranoid/Grandiose  Perception: Denies AH or VH. Thought Content:Denies SI or Plan  Insight: Poor  Judgement: Poor  Cognition: Impaired       ASSESSMENT AND PLAN:  Orville De La Torre meets criteria for a diagnosis of  Schizophrenia   Started on Zyprexa 5mg q hs. Continue inpatient stay for the safety and optimum care of the patient   Routine labs to be ordered as needed. Psychotropic medications will be ordered and adjusted as needed. Patients status is TDO  Voluntary  Supportive, milieu and group therapy. Continue rest of the medications as needed. Strengths include ability to seek help and   Estimated length of stay is 5-7 days.

## 2020-07-21 NOTE — PROGRESS NOTES
Pt visible on unit. Labile mood. Med and meal compliant.      Problem: Altered Thought Process (Adult/Pediatric)  Goal: *STG: Participates in treatment plan  Outcome: Progressing Towards Goal  Goal: *STG: Complies with medication therapy  Outcome: Progressing Towards Goal

## 2020-07-21 NOTE — INTERDISCIPLINARY ROUNDS
Behavioral Health Interdisciplinary Rounds     Patient Name: Zarina Mathis  Age: 21 y.o. Room/Bed:  734/02  Primary Diagnosis: <principal problem not specified>   Admission Status: TDO     Readmission within 30 days: no  Power of  in place: no  Patient requires a blocked bed: no          Reason for blocked bed:     VTE Prophylaxis: No    Mobility needs/Fall risk: no  Flu Vaccine : no   Nutritional Plan: no  Consults:          Labs/Testing due today?: no    Sleep hours: 7.5       Participation in Care/Groups:  yes  Medication Compliant?: Yes  PRNS (last 24 hours): Antipsychotic (PO) and Antianxiety    Restraints (last 24 hours):  no     CIWA (range last 24 hours):     COWS (range last 24 hours):      Alcohol screening (AUDIT) completed -   AUDIT Score: 0     If applicable, date SBIRT discussed in treatment team AND documented:   AUDIT Screen Score: AUDIT Score: 0    Tobacco - patient is a smoker: Have You Used Tobacco in the Past 30 Days: No  Illegal Drugs use: Have You Used Any Illegal Substances Over the Past 12 Months: No    24 hour chart check complete: yes     Patient goal(s) for today: attend groups, take medications  Treatment team focus/goals: titrate medication, add Depakote 1000mg for mood stabilizatoin, increase zyprexa 5mg TID; coordinate follow up  Progress note: Pt is alert, oriented, agitated,and cursing at treatment team. He insists he has his own means for housing and transportation but cannot give specifics. He has been threatening HI towards staff. LOS:  4  Expected LOS: TBD     Financial concerns/prescription coverage:  [facility] [employer]   Family contact:  Pallavi Calles Mother - [phone]  Family requesting physician contact today:  YES - MSW spoke with mother/Kaley, who is distraught over sons Mental health hospitalization [facility], [facility]) since 2018 and his noncompliance with outpatient treatment.  MSW reviews plan for treatment and discharge. Mom verbalizes understanding and denies any questions at this time.   Discharge plan: HOMELESS - [facility] potential referall  Access to weapons :  NA                                              Outpatient provider(s): to be linked  Patient's preferred phone number for follow up call :      Participating treatment team members: Bubba Christensen, NP - Bella Weems, RN - Barbara Nieves, PharmD - ALFREDO Anderson

## 2020-07-21 NOTE — PROGRESS NOTES
Problem: Discharge Planning  Goal: *Knowledge of medication management  Outcome: Progressing Towards Goal  Note: Patient is taking medications as prescribed. Goal: *Knowledge of discharge instructions  Outcome: Progressing Towards Goal  Note: Patient verbalizes understanding of goals for treatment and safe discharge. Problem: Discharge Planning  Goal: *Discharge to safe environment  Outcome: Not Progressing Towards Goal  Note: Patient is homeless and does not identify alternative safe housing as this time. Patient does have supportive family who help with hotels rooms sometimes.

## 2020-07-21 NOTE — PROGRESS NOTES
Laboratory Monitoring for Antipsychotics: This patient is currently prescribed the following medication(s):   Current Facility-Administered Medications   Medication Dose Route Frequency    OLANZapine (ZyPREXA) tablet 5 mg  5 mg Oral TID    divalproex ER (DEPAKOTE ER) 24 hour tablet 1,000 mg  1,000 mg Oral QHS    neomycin-bacitracin-polymyxin (NEOSPORIN) ointment   Topical BID     The following labs have been completed for monitoring of antipsychotics and/or mood stabilizers:    Height, Weight, BMI Estimation  Estimated body mass index is 26.25 kg/m² as calculated from the following:    Height as of this encounter: 180.3 cm (71\"). Weight as of this encounter: 85.4 kg (188 lb 3.2 oz). Vital Signs/Blood Pressure  Visit Vitals  /76   Pulse 68   Temp 98.2 °F (36.8 °C)   Resp 16   Ht 180.3 cm (71\")   Wt 85.4 kg (188 lb 3.2 oz)   SpO2 100%   BMI 26.25 kg/m²     Renal Function, Hepatic Function and Chemistry  Estimated Creatinine Clearance: 113.5 mL/min (by C-G formula based on SCr of 1.06 mg/dL). Lab Results   Component Value Date/Time    Sodium 140 07/17/2020 01:19 PM    Potassium 3.4 (L) 07/17/2020 01:19 PM    Chloride 104 07/17/2020 01:19 PM    CO2 28 07/17/2020 01:19 PM    Anion gap 8 07/17/2020 01:19 PM    BUN 10 07/17/2020 01:19 PM    Creatinine 1.06 07/17/2020 01:19 PM    BUN/Creatinine ratio 9 (L) 07/17/2020 01:19 PM    Bilirubin, total 0.4 07/17/2020 01:19 PM    Protein, total 7.9 07/17/2020 01:19 PM    Albumin 3.9 07/17/2020 01:19 PM    Globulin 4.0 07/17/2020 01:19 PM    A-G Ratio 1.0 (L) 07/17/2020 01:19 PM    ALT (SGPT) 27 07/17/2020 01:19 PM    AST (SGOT) 35 07/17/2020 01:19 PM    Alk.  phosphatase 76 07/17/2020 01:19 PM     Lab Results   Component Value Date/Time    Glucose 111 (H) 07/17/2020 01:19 PM     Lab Results   Component Value Date/Time    Hemoglobin A1c 5.8 (H) 06/13/2019 02:15 PM     Hematology  Lab Results   Component Value Date/Time    WBC 13.1 (H) 07/17/2020 01:19 PM    RBC 4.50 07/17/2020 01:19 PM    HGB 13.5 07/17/2020 01:19 PM    HCT 38.8 07/17/2020 01:19 PM    MCV 86.2 07/17/2020 01:19 PM    MCH 30.0 07/17/2020 01:19 PM    MCHC 34.8 07/17/2020 01:19 PM    RDW 12.9 07/17/2020 01:19 PM    PLATELET 177 62/46/5536 01:19 PM     Lipids  No results found for: CHOL, CHOLX, CHLST, CHOLV, 069182, HDL, HDLP, LDL, LDLC, DLDLP, TGLX, TRIGL, TRIGP, CHHD, CHHDX    Thyroid Function  Lab Results   Component Value Date/Time    TSH 0.68 07/17/2020 01:19 PM     Assessment/Plan:  Will order lipid panel and hemoglobin A1c or fasting glucose to complete the recommended baseline laboratory monitoring based on the patient's current medication regimen.         Nisha Fernandez, ELZAD

## 2020-07-21 NOTE — PROGRESS NOTES
Chief Complaint:  I am the one who needs to be the doctor. Length of Stay: 4 Days    Interval History:  Natalie Mccabe is doing poorly. He is profoundly psychotic, grandiose, using profanities, agitated. He remains paranoid, suspicious, responding to internal stimuli. He was threatening to hurt nursing staff and to kill me since I am not discharging him today. He is demanding to be released. Slept over 7 hours. He is taking his meds and denies side effects. Past Medical History:  No past medical history on file. Labs:  Lab Results   Component Value Date/Time    WBC 13.1 (H) 07/17/2020 01:19 PM    HGB 13.5 07/17/2020 01:19 PM    HCT 38.8 07/17/2020 01:19 PM    PLATELET 848 02/42/4545 01:19 PM    MCV 86.2 07/17/2020 01:19 PM      Lab Results   Component Value Date/Time    Sodium 140 07/17/2020 01:19 PM    Potassium 3.4 (L) 07/17/2020 01:19 PM    Chloride 104 07/17/2020 01:19 PM    CO2 28 07/17/2020 01:19 PM    Anion gap 8 07/17/2020 01:19 PM    Glucose 111 (H) 07/17/2020 01:19 PM    BUN 10 07/17/2020 01:19 PM    Creatinine 1.06 07/17/2020 01:19 PM    BUN/Creatinine ratio 9 (L) 07/17/2020 01:19 PM    GFR est AA >60 07/17/2020 01:19 PM    GFR est non-AA >60 07/17/2020 01:19 PM    Calcium 9.0 07/17/2020 01:19 PM    Bilirubin, total 0.4 07/17/2020 01:19 PM    Alk.  phosphatase 76 07/17/2020 01:19 PM    Protein, total 7.9 07/17/2020 01:19 PM    Albumin 3.9 07/17/2020 01:19 PM    Globulin 4.0 07/17/2020 01:19 PM    A-G Ratio 1.0 (L) 07/17/2020 01:19 PM    ALT (SGPT) 27 07/17/2020 01:19 PM      Vitals:    07/20/20 0802 07/20/20 1552 07/20/20 1938 07/21/20 1009   BP: 129/77 122/79 124/85 130/76   Pulse: 77 84 73 68   Resp: 16 16 16 16   Temp: 97.9 °F (36.6 °C) 98.3 °F (36.8 °C)  98.2 °F (36.8 °C)   SpO2: 100% 100% 100%    Weight:       Height:             Current Facility-Administered Medications   Medication Dose Route Frequency Provider Last Rate Last Dose    OLANZapine (ZyPREXA) tablet 5 mg  5 mg Oral TID Hugo Rajesh SHYAM NP        divalproex ER (DEPAKOTE ER) 24 hour tablet 1,000 mg  1,000 mg Oral QHS Lamar Khan NP        LORazepam (ATIVAN) injection 2 mg  2 mg IntraMUSCular Q4H PRN Lamar Khan NP        nicotine buccal (POLACRILEX) lozenge 2 mg  2 mg Oral Q2H PRN Ross Wray, NP   2 mg at 07/20/20 1651    neomycin-bacitracin-polymyxin (NEOSPORIN) ointment   Topical BID Arelis Duarte NP        loratadine (CLARITIN) tablet 10 mg  10 mg Oral DAILY PRN Arelis Duarte NP        OLANZapine (ZyPREXA) tablet 5 mg  5 mg Oral Q6H PRN Benay Saravanan' V, FNP   5 mg at 07/20/20 1932    haloperidol lactate (HALDOL) injection 5 mg  5 mg IntraMUSCular Q6H PRN Benay Saravanan' V, FNP   5 mg at 07/19/20 2037    benztropine (COGENTIN) tablet 1 mg  1 mg Oral BID PRN Benay Saravanan' V, FNP        diphenhydrAMINE (BENADRYL) injection 50 mg  50 mg IntraMUSCular BID PRN Ross Wray NP   50 mg at 07/19/20 2102    hydrOXYzine HCL (ATARAX) tablet 50 mg  50 mg Oral TID PRN Benay Saravanan' V, FNP   50 mg at 07/20/20 1618    traZODone (DESYREL) tablet 50 mg  50 mg Oral QHS PRN Benay Saravanan' El Rito Carota, FNP        acetaminophen (TYLENOL) tablet 650 mg  650 mg Oral Q4H PRN Benay Saravanan' V, FNP        magnesium hydroxide (MILK OF MAGNESIA) 400 mg/5 mL oral suspension 30 mL  30 mL Oral DAILY PRN Benay Saravanan' [place] Rito Carota, FNP             Mental Status Exam:  Eye contact: Poor  Grooming: poor  Psychomotor activity: increased  Speech is pressured  Mood is constricted  Affect:paranoid  Perception:+ah  Suicidal ideation: denies si  Cognition is impaired          Physical Exam:  Body habitus: Body mass index is 26.25 kg/m². Musculoskeletal system: normal gait  Tremor - neg  Cog wheeling - neg      Assessment and Plan:  Last Mahoney meets criteria for a diagnosis of Schizophrenia    Increase zyprexa to 5mg tid. depakote er 1000mg qhs.   Continue the medication regimen as prescribed  Disposition planning to continue. I certify that this patients inpatient psychiatric hospital services furnished since the previous certification were, and continue to be, required for treatment that could reasonably be expected to improve the patient's condition, or for diagnostic study, and that the patient continues to need, on a daily basis, active treatment furnished directly by or requiring the supervision of inpatient psychiatric facility personnel. In addition, the hospital records show that services furnished were intensive treatment services, admission or related services, or equivalent services.

## 2020-07-21 NOTE — PROGRESS NOTES
Problem: Altered Thought Process (Adult/Pediatric)  Goal: *STG: Decreased delusional thinking  Outcome: Progressing Towards Goal     Received pt in bed resting. Pt appears to be in no distress. Respirations even and unlabored. Continuing to monitor pt with q15 min safety rounds.

## 2020-07-22 LAB
CHOLEST SERPL-MCNC: 120 MG/DL
EST. AVERAGE GLUCOSE BLD GHB EST-MCNC: 114 MG/DL
HBA1C MFR BLD: 5.6 % (ref 4–5.6)
HDLC SERPL-MCNC: 34 MG/DL
HDLC SERPL: 3.5 {RATIO} (ref 0–5)
LDLC SERPL CALC-MCNC: 75.4 MG/DL (ref 0–100)
LIPID PROFILE,FLP: NORMAL
SARS-COV-2, COV2NT: NOT DETECTED
SOURCE, COVRS: NORMAL
SPECIMEN SOURCE, FCOV2M: NORMAL
TRIGL SERPL-MCNC: 53 MG/DL (ref ?–150)
VLDLC SERPL CALC-MCNC: 10.6 MG/DL

## 2020-07-22 PROCEDURE — 65220000003 HC RM SEMIPRIVATE PSYCH

## 2020-07-22 PROCEDURE — 83036 HEMOGLOBIN GLYCOSYLATED A1C: CPT

## 2020-07-22 PROCEDURE — 80061 LIPID PANEL: CPT

## 2020-07-22 PROCEDURE — 74011250637 HC RX REV CODE- 250/637: Performed by: NURSE PRACTITIONER

## 2020-07-22 PROCEDURE — 36415 COLL VENOUS BLD VENIPUNCTURE: CPT

## 2020-07-22 RX ORDER — OLANZAPINE 5 MG/1
10 TABLET ORAL 2 TIMES DAILY
Status: DISCONTINUED | OUTPATIENT
Start: 2020-07-22 | End: 2020-07-25

## 2020-07-22 RX ORDER — OLANZAPINE 5 MG/1
5 TABLET ORAL
Status: COMPLETED | OUTPATIENT
Start: 2020-07-22 | End: 2020-07-22

## 2020-07-22 RX ADMIN — DIVALPROEX SODIUM 1000 MG: 500 TABLET, EXTENDED RELEASE ORAL at 20:56

## 2020-07-22 RX ADMIN — BACITRACIN ZINC NEOMYCIN SULFATE POLYMYXIN B SULFATE: 400; 3.5; 5 OINTMENT TOPICAL at 17:31

## 2020-07-22 RX ADMIN — OLANZAPINE 5 MG: 5 TABLET, FILM COATED ORAL at 10:59

## 2020-07-22 RX ADMIN — OLANZAPINE 10 MG: 5 TABLET, FILM COATED ORAL at 20:56

## 2020-07-22 RX ADMIN — OLANZAPINE 5 MG: 5 TABLET, FILM COATED ORAL at 08:24

## 2020-07-22 RX ADMIN — BACITRACIN ZINC NEOMYCIN SULFATE POLYMYXIN B SULFATE: 400; 3.5; 5 OINTMENT TOPICAL at 08:24

## 2020-07-22 RX ADMIN — HYDROXYZINE HYDROCHLORIDE 50 MG: 50 TABLET ORAL at 10:59

## 2020-07-22 NOTE — PROGRESS NOTES
Chief Complaint:  Good. Length of Stay: 5 Days    Interval History:  Micaela Andersen remains discharge focused. He continues to do poorly, profoundly psychotic, he was agitated, knocking on the nurse's window, cursing. He was given prn po zyprexa. He is very paranoid, he thinks the nursing staff are out to hurt him. Thought process is loose. \"Ain't nothing retarded, all that testing is uncomfortable, I think today is the day the Kimi Dawson has made\". He warrants ongoing close monitoring associated with his agitation and psychosis. Slept over 8 hours. He is taking his meds and denies side effects. Past Medical History:  No past medical history on file. Labs:  Lab Results   Component Value Date/Time    WBC 13.1 (H) 07/17/2020 01:19 PM    HGB 13.5 07/17/2020 01:19 PM    HCT 38.8 07/17/2020 01:19 PM    PLATELET 082 85/97/3896 01:19 PM    MCV 86.2 07/17/2020 01:19 PM      Lab Results   Component Value Date/Time    Sodium 140 07/17/2020 01:19 PM    Potassium 3.4 (L) 07/17/2020 01:19 PM    Chloride 104 07/17/2020 01:19 PM    CO2 28 07/17/2020 01:19 PM    Anion gap 8 07/17/2020 01:19 PM    Glucose 111 (H) 07/17/2020 01:19 PM    BUN 10 07/17/2020 01:19 PM    Creatinine 1.06 07/17/2020 01:19 PM    BUN/Creatinine ratio 9 (L) 07/17/2020 01:19 PM    GFR est AA >60 07/17/2020 01:19 PM    GFR est non-AA >60 07/17/2020 01:19 PM    Calcium 9.0 07/17/2020 01:19 PM    Bilirubin, total 0.4 07/17/2020 01:19 PM    Alk.  phosphatase 76 07/17/2020 01:19 PM    Protein, total 7.9 07/17/2020 01:19 PM    Albumin 3.9 07/17/2020 01:19 PM    Globulin 4.0 07/17/2020 01:19 PM    A-G Ratio 1.0 (L) 07/17/2020 01:19 PM    ALT (SGPT) 27 07/17/2020 01:19 PM      Vitals:    07/20/20 1938 07/21/20 1009 07/21/20 1544 07/22/20 0738   BP: 124/85 130/76 128/81 128/80   Pulse: 73 68 95 71   Resp: 16 16 18 16   Temp:  98.2 °F (36.8 °C) 98.4 °F (36.9 °C) 98 °F (36.7 °C)   SpO2: 100%  98% 100%   Weight:       Height:             Current Facility-Administered Medications   Medication Dose Route Frequency Provider Last Rate Last Dose    OLANZapine (ZyPREXA) tablet 5 mg  5 mg Oral TID Lamar Khan NP   5 mg at 07/22/20 0824    divalproex ER (DEPAKOTE ER) 24 hour tablet 1,000 mg  1,000 mg Oral QHS Lamar Khan, NP   1,000 mg at 07/21/20 2040    LORazepam (ATIVAN) injection 2 mg  2 mg IntraMUSCular Q4H PRN Lamar Khan NP   2 mg at 07/21/20 1025    diphenhydrAMINE (BENADRYL) injection 50 mg  50 mg IntraMUSCular Q6H PRN Lamar Khan NP   50 mg at 07/21/20 1025    nicotine buccal (POLACRILEX) lozenge 2 mg  2 mg Oral Q2H PRN Hussain Aguirre NP   2 mg at 07/21/20 1447    neomycin-bacitracin-polymyxin (NEOSPORIN) ointment   Topical BID Manasa De La Torre NP        loratadine (CLARITIN) tablet 10 mg  10 mg Oral DAILY PRN Manasa De La Torre NP        OLANZapine (ZyPREXA) tablet 5 mg  5 mg Oral Q6H PRN Clinton Rocha' V, FNP   5 mg at 07/21/20 1710    haloperidol lactate (HALDOL) injection 5 mg  5 mg IntraMUSCular Q6H PRN Clinton Braun V, FNP   5 mg at 07/21/20 1102    benztropine (COGENTIN) tablet 1 mg  1 mg Oral BID PRN Clinton Hou Flicker, FNP        hydrOXYzine HCL (ATARAX) tablet 50 mg  50 mg Oral TID PRN Clinton Rocha' V, FNP   50 mg at 07/21/20 1710    traZODone (DESYREL) tablet 50 mg  50 mg Oral QHS PRN Clinton Mayberryie Flicker, FNP        acetaminophen (TYLENOL) tablet 650 mg  650 mg Oral Q4H PRN Clinton Rocha' V, FNP        magnesium hydroxide (MILK OF MAGNESIA) 400 mg/5 mL oral suspension 30 mL  30 mL Oral DAILY PRN Clinton Mayberryie Flicker, FNP             Mental Status Exam:  Eye contact: Poor  Grooming: poor  Psychomotor activity: increased  Speech is pressured  Mood is constricted  Affect:paranoid  Perception:+ah  Suicidal ideation: denies si  Cognition is impaired          Physical Exam:  Body habitus: Body mass index is 26.25 kg/m².   Musculoskeletal system: normal gait  Tremor - neg  Cog wheeling - neg      Assessment and Plan:  Yamila Napier meets criteria for a diagnosis of Schizophrenia    Increase zyprexa to 10mg bid with a now dose of 5mg. Continue the medication regimen as prescribed  Disposition planning to continue. I certify that this patients inpatient psychiatric hospital services furnished since the previous certification were, and continue to be, required for treatment that could reasonably be expected to improve the patient's condition, or for diagnostic study, and that the patient continues to need, on a daily basis, active treatment furnished directly by or requiring the supervision of inpatient psychiatric facility personnel. In addition, the hospital records show that services furnished were intensive treatment services, admission or related services, or equivalent services.

## 2020-07-22 NOTE — PROGRESS NOTES
Problem: Falls - Risk of  Goal: *Absence of Falls  Description: Document Abraham North Fall Risk and appropriate interventions in the flowsheet. Outcome: Progressing Towards Goal  Note: Fall Risk Interventions:  Mobility Interventions: Assess mobility with egress test         Medication Interventions: Teach patient to arise slowly    Elimination Interventions: Toilet paper/wipes in reach    History of Falls Interventions: Door open when patient unattended      Resting in bed with eyes closed, no complaints, no distress noted. Safety measures in place, will continue to monitor.

## 2020-07-22 NOTE — PROGRESS NOTES
Laboratory Monitoring for Antipsychotics: This patient is currently prescribed the following medication(s):   Current Facility-Administered Medications   Medication Dose Route Frequency    OLANZapine (ZyPREXA) tablet 5 mg  5 mg Oral TID    divalproex ER (DEPAKOTE ER) 24 hour tablet 1,000 mg  1,000 mg Oral QHS    neomycin-bacitracin-polymyxin (NEOSPORIN) ointment   Topical BID     The following labs have been completed for monitoring of antipsychotics and/or mood stabilizers:    Height, Weight, BMI Estimation  Estimated body mass index is 26.25 kg/m² as calculated from the following:    Height as of this encounter: 180.3 cm (71\"). Weight as of this encounter: 85.4 kg (188 lb 3.2 oz). Vital Signs/Blood Pressure  Visit Vitals  /80 (BP 1 Location: Left arm, BP Patient Position: Sitting)   Pulse 71   Temp 98 °F (36.7 °C)   Resp 16   Ht 180.3 cm (71\")   Wt 85.4 kg (188 lb 3.2 oz)   SpO2 100%   BMI 26.25 kg/m²     Renal Function, Hepatic Function and Chemistry  Estimated Creatinine Clearance: 113.5 mL/min (by C-G formula based on SCr of 1.06 mg/dL). Lab Results   Component Value Date/Time    Sodium 140 07/17/2020 01:19 PM    Potassium 3.4 (L) 07/17/2020 01:19 PM    Chloride 104 07/17/2020 01:19 PM    CO2 28 07/17/2020 01:19 PM    Anion gap 8 07/17/2020 01:19 PM    BUN 10 07/17/2020 01:19 PM    Creatinine 1.06 07/17/2020 01:19 PM    BUN/Creatinine ratio 9 (L) 07/17/2020 01:19 PM    Bilirubin, total 0.4 07/17/2020 01:19 PM    Protein, total 7.9 07/17/2020 01:19 PM    Albumin 3.9 07/17/2020 01:19 PM    Globulin 4.0 07/17/2020 01:19 PM    A-G Ratio 1.0 (L) 07/17/2020 01:19 PM    ALT (SGPT) 27 07/17/2020 01:19 PM    AST (SGOT) 35 07/17/2020 01:19 PM    Alk.  phosphatase 76 07/17/2020 01:19 PM     Lab Results   Component Value Date/Time    Glucose 111 (H) 07/17/2020 01:19 PM     Lab Results   Component Value Date/Time    Hemoglobin A1c 5.6 07/22/2020 06:39 AM     Hematology  Lab Results   Component Value Date/Time    WBC 13.1 (H) 07/17/2020 01:19 PM    RBC 4.50 07/17/2020 01:19 PM    HGB 13.5 07/17/2020 01:19 PM    HCT 38.8 07/17/2020 01:19 PM    MCV 86.2 07/17/2020 01:19 PM    MCH 30.0 07/17/2020 01:19 PM    MCHC 34.8 07/17/2020 01:19 PM    RDW 12.9 07/17/2020 01:19 PM    PLATELET 398 13/70/9187 01:19 PM     Lipids  Lab Results   Component Value Date/Time    Cholesterol, total 120 07/22/2020 06:39 AM    HDL Cholesterol 34 07/22/2020 06:39 AM    LDL, calculated 75.4 07/22/2020 06:39 AM    Triglyceride 53 07/22/2020 06:39 AM    CHOL/HDL Ratio 3.5 07/22/2020 06:39 AM     Thyroid Function  Lab Results   Component Value Date/Time    TSH 0.68 07/17/2020 01:19 PM     Assessment/Plan:  Recommended baseline laboratory monitoring has been completed based on this patient's current medication regimen. No further interventions are needed at this time. Follow-up metabolic monitoring labs should be completed in 3 months (quarterly for the first year of antipsychotic therapy).      ELZA ScalesD

## 2020-07-22 NOTE — PROGRESS NOTES
Problem: Altered Thought Process (Adult/Pediatric)  Goal: *STG: Complies with medication therapy  Outcome: Progressing Towards Goal  Goal: *STG: Decreased delusional thinking  Outcome: Progressing Towards Goal  Goal: *STG: Decreased hallucinations  Outcome: Progressing Towards Goal     Problem: Patient Education: Go to Patient Education Activity  Goal: Patient/Family Education  Outcome: Progressing Towards Goal    Patient has remained calm during shift, only agitation shown was during treatment team when told he would need for 5 more days without incident. Patient redirected and calmed quickly. Patient has spent majority of day in dining room with staff and peers.

## 2020-07-22 NOTE — INTERDISCIPLINARY ROUNDS
Behavioral Health Interdisciplinary Rounds     Patient Name: Erin Bradley  Age: 21 y.o. Room/Bed:  734/02  Primary Diagnosis: <principal problem not specified>   Admission Status: Involuntary Commitment     Readmission within 30 days: no  Power of  in place: no  Patient requires a blocked bed:       Reason for blocked bed:     VTE Prophylaxis: No    Mobility needs/Fall risk: no  Flu Vaccine : no   Nutritional Plan: no  Consults:          Labs/Testing due today?: yes    Sleep hours:  8      Participation in Care/Groups:   Medication Compliant?: Yes  PRNS (last 24 hours): Antipsychotic (PO), Antipsychotic (IM) and Antianxiety    Restraints (last 24 hours):  no     CIWA (range last 24 hours):     COWS (range last 24 hours):      Alcohol screening (AUDIT) completed -   AUDIT Score: 0     If applicable, date SBIRT discussed in treatment team AND documented:   AUDIT Screen Score: AUDIT Score: 0    Tobacco - patient is a smoker: Have You Used Tobacco in the Past 30 Days: No  Illegal Drugs use: Have You Used Any Illegal Substances Over the Past 12 Months: No    24 hour chart check complete: yes     Patient goal(s) for today: attend groups, take medications  Treatment team focus/goals: titrate medication, increase zyprexa coordinate follow up. Progress note: Pt is alert, oriented, calm and cooperative, eating an apple during treatment team. Pt is medication compliant. Denies SI/HI. He gets a little agitated and demands staff listen to him and says \"I am not retarded this testing is not necessary\"     LOS:  5  Expected LOS: TBD     Financial concerns/prescription coverage:  [address] contact:  Trevon Huff Mother - [phone]  Family requesting physician contact today: Franchesca Husbands - MSW spoke with mother/Kaley, who is distraught over [facility] Mental health hospitalization [facility], [facility]) since 2018 and his noncompliance with outpatient treatment.  MSW reviews plan for treatment and discharge. Mom verbalizes understanding and denies any questions at this time.   Discharge plan: HOMELESS - [facility] potential referall  Access to weapons :  IZ                                              Outpatient provider(s): to be linked  Patient's preferred phone number for follow up call :      Participating treatment team members: Keenan Brush, DIOGENES - Alec Abebe, RN - Linda Marie, PharmD - Sandra Mayen MSW

## 2020-07-22 NOTE — PROGRESS NOTES
Patient educated on fall precautions while taking sedative medications. Will continue to educate and monitor. Problem: Altered Thought Process (Adult/Pediatric)  Goal: *STG: Complies with medication therapy  Outcome: Progressing Towards Goal     Problem: Falls - Risk of  Goal: *Absence of Falls  Description: Document Lucy Fall Risk and appropriate interventions in the flowsheet. Outcome: Progressing Towards Goal  Note: Fall Risk Interventions:  Mobility Interventions: Assess mobility with egress test         Medication Interventions: Teach patient to arise slowly, Evaluate medications/consider consulting pharmacy    Elimination Interventions:  Toilet paper/wipes in reach    History of Falls Interventions: Door open when patient unattended

## 2020-07-23 PROCEDURE — 65220000003 HC RM SEMIPRIVATE PSYCH

## 2020-07-23 PROCEDURE — 74011250637 HC RX REV CODE- 250/637: Performed by: NURSE PRACTITIONER

## 2020-07-23 RX ADMIN — BACITRACIN ZINC NEOMYCIN SULFATE POLYMYXIN B SULFATE: 400; 3.5; 5 OINTMENT TOPICAL at 16:15

## 2020-07-23 RX ADMIN — BACITRACIN ZINC NEOMYCIN SULFATE POLYMYXIN B SULFATE: 400; 3.5; 5 OINTMENT TOPICAL at 21:02

## 2020-07-23 RX ADMIN — OLANZAPINE 10 MG: 5 TABLET, FILM COATED ORAL at 20:53

## 2020-07-23 RX ADMIN — DIVALPROEX SODIUM 1000 MG: 500 TABLET, EXTENDED RELEASE ORAL at 20:53

## 2020-07-23 RX ADMIN — OLANZAPINE 10 MG: 5 TABLET, FILM COATED ORAL at 08:18

## 2020-07-23 RX ADMIN — BACITRACIN ZINC NEOMYCIN SULFATE POLYMYXIN B SULFATE: 400; 3.5; 5 OINTMENT TOPICAL at 08:19

## 2020-07-23 NOTE — PROGRESS NOTES
Problem: Altered Thought Process (Adult/Pediatric)  Goal: *STG: Complies with medication therapy  Outcome: Progressing Towards Goal  Goal: *STG: Decreased delusional thinking  Outcome: Progressing Towards Goal  Goal: *STG: Decreased hallucinations  Outcome: Progressing Towards Goal     Problem: Patient Education: Go to Patient Education Activity  Goal: Patient/Family Education  Outcome: Progressing Towards Goal     Patient seen in dining room interaction with peers and staff, calm and cooperative early in shift. Patient med compliant. No aggression noted. [id number]  Applied Neosporin and bandage to right plantar foot, no s/s of infection.

## 2020-07-23 NOTE — BH NOTES
GROUP THERAPY PROGRESS NOTE    Alexis Altman is participating in  [address] [place] time: 45 minutes    Personal goal for participation: Prep for Today    Goal orientation: Coping/ Adjusting / [facility] participation: minimal    Therapeutic interventions reviewed and discussed:     Impression of participation: Pt described his mood as swing ( elevated) but feeling very good. His participation was restricted but he did provided some insight into managing  on the unit and in the community. Pt's thought s were somewhat disorganized but US and peers encouraged him to stay in the presence which he found difficulty managing his moods.

## 2020-07-23 NOTE — PROGRESS NOTES
Pt is calm and cooperative. Disorganized thought process noted at times when speaking with staff or peers. Med and meal compliant.      Problem: Altered Thought Process (Adult/Pediatric)  Goal: *STG: Participates in treatment plan  Outcome: Progressing Towards Goal  Goal: *STG: Complies with medication therapy  Outcome: Progressing Towards Goal  Goal: *STG: Attends activities and groups  Outcome: Progressing Towards Goal

## 2020-07-23 NOTE — PROGRESS NOTES
Chief Complaint:  I am doing better. Length of Stay: 6 Days    Interval History:  Angelique Foote appears much calmer this morning, less psychotic, thought process is less disorganized. Mood is a little elevated. No aggression in the last 24 hours, no prn im medication given. He is not as focused on discharge. Sleeping and eating well. He is able to participate in groups. He remains compliant with his meds and denies side effects. Denies si or hi. It seems like we are now seeing improvement. Past Medical History:  No past medical history on file. Labs:  Lab Results   Component Value Date/Time    WBC 13.1 (H) 07/17/2020 01:19 PM    HGB 13.5 07/17/2020 01:19 PM    HCT 38.8 07/17/2020 01:19 PM    PLATELET 129 09/70/7532 01:19 PM    MCV 86.2 07/17/2020 01:19 PM      Lab Results   Component Value Date/Time    Sodium 140 07/17/2020 01:19 PM    Potassium 3.4 (L) 07/17/2020 01:19 PM    Chloride 104 07/17/2020 01:19 PM    CO2 28 07/17/2020 01:19 PM    Anion gap 8 07/17/2020 01:19 PM    Glucose 111 (H) 07/17/2020 01:19 PM    BUN 10 07/17/2020 01:19 PM    Creatinine 1.06 07/17/2020 01:19 PM    BUN/Creatinine ratio 9 (L) 07/17/2020 01:19 PM    GFR est AA >60 07/17/2020 01:19 PM    GFR est non-AA >60 07/17/2020 01:19 PM    Calcium 9.0 07/17/2020 01:19 PM    Bilirubin, total 0.4 07/17/2020 01:19 PM    Alk.  phosphatase 76 07/17/2020 01:19 PM    Protein, total 7.9 07/17/2020 01:19 PM    Albumin 3.9 07/17/2020 01:19 PM    Globulin 4.0 07/17/2020 01:19 PM    A-G Ratio 1.0 (L) 07/17/2020 01:19 PM    ALT (SGPT) 27 07/17/2020 01:19 PM      Vitals:    07/21/20 1544 07/22/20 0738 07/22/20 1546 07/23/20 0821   BP: 128/81 128/80 107/68 128/76   Pulse: 95 71 99 86   Resp: 18 16 14 16   Temp: 98.4 °F (36.9 °C) 98 °F (36.7 °C) 98.3 °F (36.8 °C) 98.1 °F (36.7 °C)   SpO2: 98% 100% 100% 100%   Weight:       Height:             Current Facility-Administered Medications   Medication Dose Route Frequency Provider Last Rate Last Dose    OLANZapine (ZyPREXA) tablet 10 mg  10 mg Oral BID Bill Lamar A, NP   10 mg at 07/23/20 0818    divalproex ER (DEPAKOTE ER) 24 hour tablet 1,000 mg  1,000 mg Oral QHS Bill Lamar A, NP   1,000 mg at 07/22/20 2056    LORazepam (ATIVAN) injection 2 mg  2 mg IntraMUSCular Q4H PRN Lamar Khan, NP   2 mg at 07/21/20 1025    diphenhydrAMINE (BENADRYL) injection 50 mg  50 mg IntraMUSCular Q6H PRN Lamar Khan, NP   50 mg at 07/21/20 1025    nicotine buccal (POLACRILEX) lozenge 2 mg  2 mg Oral Q2H PRN Halley Goncalves NP   2 mg at 07/21/20 1447    neomycin-bacitracin-polymyxin (NEOSPORIN) ointment   Topical BID Rod Ashraf, NP        loratadine (CLARITIN) tablet 10 mg  10 mg Oral DAILY PRN Rod Ashraf, NP        OLANZapine (ZyPREXA) tablet 5 mg  5 mg Oral Q6H PRN Liane Liming' V, FNP   5 mg at 07/21/20 1710    haloperidol lactate (HALDOL) injection 5 mg  5 mg IntraMUSCular Q6H PRN Liane Liming' V, FNP   5 mg at 07/21/20 1102    benztropine (COGENTIN) tablet 1 mg  1 mg Oral BID PRN Liane Liming' Bong Mcburney, FNP        hydrOXYzine HCL (ATARAX) tablet 50 mg  50 mg Oral TID PRN Liane Liming' V, FNP   50 mg at 07/22/20 1059    traZODone (DESYREL) tablet 50 mg  50 mg Oral QHS PRN Liane Liming' Bong Mcburney, FNP        acetaminophen (TYLENOL) tablet 650 mg  650 mg Oral Q4H PRN Liane Liming' V, FNP        magnesium hydroxide (MILK OF MAGNESIA) 400 mg/5 mL oral suspension 30 mL  30 mL Oral DAILY PRN Liane Liming' Bong Mcburney, FNP             Mental Status Exam:  Eye contact: good  Grooming: good  Psychomotor activity: relaxed  Speech is less pressured  Mood is elevated  Affect:full  Perception:+ah  Suicidal ideation: denies si  Cognition is grossly intact          Physical Exam:  Body habitus: Body mass index is 26.25 kg/m².   Musculoskeletal system: normal gait  Tremor - neg  Cog wheeling - neg      Assessment and Plan:  Porter Crealexis meets criteria for a diagnosis of Schizophrenia      Continue the medication regimen as prescribed  Disposition planning to continue. I certify that this patients inpatient psychiatric hospital services furnished since the previous certification were, and continue to be, required for treatment that could reasonably be expected to improve the patient's condition, or for diagnostic study, and that the patient continues to need, on a daily basis, active treatment furnished directly by or requiring the supervision of inpatient psychiatric facility personnel. In addition, the hospital records show that services furnished were intensive treatment services, admission or related services, or equivalent services.

## 2020-07-23 NOTE — INTERDISCIPLINARY ROUNDS
Behavioral Health Interdisciplinary Rounds     Patient Name: Erin Bradley  Age: 21 y.o. Room/Bed:  734/02  Primary Diagnosis: <principal problem not specified>   Admission Status: Involuntary Commitment     Readmission within 30 days: no  Power of  in place: no  Patient requires a blocked bed:        Reason for blocked bed:     VTE Prophylaxis: No    Mobility needs/Fall risk: no  Flu Vaccine : no   Nutritional Plan: no  Consults:          Labs/Testing due today?: no    Sleep hours:  7      Participation in Care/Groups:    Medication Compliant?: Yes  PRNS (last 24 hours): Antianxiety    Restraints (last 24 hours):  no     CIWA (range last 24 hours):     COWS (range last 24 hours):      Alcohol screening (AUDIT) completed -   AUDIT Score: 0     If applicable, date SBIRT discussed in treatment team AND documented:   AUDIT Screen Score: AUDIT Score: 0    Tobacco - patient is a smoker: Have You Used Tobacco in the Past 30 Days: No  Illegal Drugs use: Have You Used Any Illegal Substances Over the Past 12 Months: No    24 hour chart check complete: yes     Patient goal(s) for today: attend groups, take medications  Treatment team focus/goals: titrate medication, coordinate follow up. Progress note: Pt is alert, oriented, calmer and cooperative with treatment. Plan to discharge Monday. LOS:  6  Expected LOS: TBD     Financial concerns/prescription coverage:  [employer]   Family contact:  Trevon Huff Mother - [phone]  Family requesting physician contact today:  YES - MSW spoke with mother/Kaley, who is distraught over [facility] Mental health hospitalization [facility]) since 2018 and his noncompliance with outpatient treatment. MSW reviews plan for treatment and discharge. Mom verbalizes understanding and denies any questions at this time.   Discharge plan: HOMELESS - [facility] potential referall  Access to weapons Bong Obrien  Outpatient provider(s): to be linked  Patient's preferred phone number for follow up call :      Participating treatment team members: Keenan Hdez, Polina Aburto, RN - Feroz Jj, PharmD -ALFREDO Petty

## 2020-07-23 NOTE — PROGRESS NOTES
Problem: Discharge Planning  Goal: *Discharge to safe environment  Outcome: Not Progressing Towards Goal  Note: Patient is homeless and does not identify alternative safe housing. He will be referred to [facility]. Pt has supportive family. Problem: Discharge Planning  Goal: *Knowledge of medication management  Outcome: Progressing Towards Goal  Note: Patient is taking medications as prescribed  Goal: *Knowledge of discharge instructions  Outcome: Progressing Towards Goal  Note: Patient verbalizes understanding of goals for treatment and safe discharge.

## 2020-07-24 PROCEDURE — 65220000003 HC RM SEMIPRIVATE PSYCH

## 2020-07-24 PROCEDURE — 74011250637 HC RX REV CODE- 250/637: Performed by: NURSE PRACTITIONER

## 2020-07-24 PROCEDURE — 74011250636 HC RX REV CODE- 250/636: Performed by: NURSE PRACTITIONER

## 2020-07-24 RX ADMIN — OLANZAPINE 10 MG: 5 TABLET, FILM COATED ORAL at 21:31

## 2020-07-24 RX ADMIN — HALOPERIDOL LACTATE 5 MG: 5 INJECTION, SOLUTION INTRAMUSCULAR at 14:48

## 2020-07-24 RX ADMIN — OLANZAPINE 10 MG: 5 TABLET, FILM COATED ORAL at 08:09

## 2020-07-24 RX ADMIN — LORAZEPAM 2 MG: 2 INJECTION INTRAMUSCULAR; INTRAVENOUS at 14:48

## 2020-07-24 RX ADMIN — DIVALPROEX SODIUM 1000 MG: 500 TABLET, EXTENDED RELEASE ORAL at 21:31

## 2020-07-24 RX ADMIN — HYDROXYZINE HYDROCHLORIDE 50 MG: 50 TABLET ORAL at 14:21

## 2020-07-24 RX ADMIN — BACITRACIN ZINC NEOMYCIN SULFATE POLYMYXIN B SULFATE: 400; 3.5; 5 OINTMENT TOPICAL at 08:11

## 2020-07-24 RX ADMIN — OLANZAPINE 5 MG: 5 TABLET, FILM COATED ORAL at 14:21

## 2020-07-24 RX ADMIN — DIPHENHYDRAMINE HYDROCHLORIDE 50 MG: 50 INJECTION, SOLUTION INTRAMUSCULAR; INTRAVENOUS at 14:48

## 2020-07-24 NOTE — PROGRESS NOTES
Problem: Falls - Risk of  Goal: *Absence of Falls  Description: Document Precious Barboza Fall Risk and appropriate interventions in the flowsheet. Outcome: Progressing Towards Goal  Note: Fall Risk Interventions:  Mobility Interventions: Assess mobility with egress test         Medication Interventions: Teach patient to arise slowly    Elimination Interventions: Toilet paper/wipes in reach    History of Falls Interventions: Door open when patient unattended      Resting in bed with eyes closed, no complaints, no distress noted. Safety measures in place, will continue to monitor.

## 2020-07-24 NOTE — PROGRESS NOTES
1422- PRN Medication Documentation    Specific patient behavior that led to need for PRN medication: pt is yelling cursing, threatening, posturing, demanding to leave, angry out burst, verbally aggressive, poor insight, poor safety awareness  1434- throwing note books, threatening to hit staff \" I'M GOING TO SLAP THAT BITCH. I AM GOING TO FIRE YOU SHORTIE\" \"I'M GOING TO TEAR THIS PLACE UP\" \"I\"M THE GOD DAMN DEVIL\"   1448- PRN Medication Documentation    Specific patient behavior that led to need for PRN medication: AGITATED THREATENING STAFF THROWING OBJECTS, POSTURING, BANGING ON THE WINDOWS while threatening staff  Staff interventions attempted prior to PRN being given: education, therapeutic communication   PRN medication given: IM 2 mg ativan, IM 5 mg Haldol, IM 50 mg Benadryl   Patient response/effectiveness of PRN medication: pt is yelling cursing confrontational    [phone]- pt is calm in his room. Staff interventions attempted prior to PRN being given: education, therapeutic communication, coping skills    PRN medication given: po 50 mg Atarax and po 5 mg Zyprexa   Patient response/effectiveness of PRN medication: pt appears angry sitting in the dining room     Problem: Altered Thought Process (Adult/Pediatric)  Goal: *STG: Participates in treatment plan  Outcome: Progressing Towards Goal     Problem: Altered Thought Process (Adult/Pediatric)  Goal: *STG: Complies with medication therapy  Outcome: Progressing Towards Goal     Problem: Altered Thought Process (Adult/Pediatric)  Goal: *STG: Attends activities and groups  Outcome: Progressing Towards Goal     Problem: Altered Thought Process (Adult/Pediatric)  Goal: Interventions  Outcome: Progressing Towards Goal  Note: Pt is resting in bed at start of shift. NAD. Respirations are regular and unlabored. Pt can be impulsive at time. Distracted. Medication meal complaint. Showered and dressed appropriately after changing clothing in day room.    Verbalizes needs to staff. Pt participates in treatment team meeting. Verbalizes goal of discharge Monday. Goal is to use coping skills to manage impulsive anger and outbursts.     preferred activity: music     [address]  Master Treatment Plan for Yoshi Bai    Date Treatment Plan Initiated: 7/24/2020    Treatment Plan Modalities:  Type of Modality Amount  (x minutes) Frequency (x/week) Duration (x days) Name of Responsible Staff   710 [facility] meetings to encourage peer interactions 15 7 1 Alicia CARIAS     Group psychotherapy to assist in building coping skills and internal controls 60 7 1 Jay Melo   Therapeutic activity groups to build coping skills 60 7 1 Jay Melo   Psychoeducation in group setting to address:   Medication education   15 7 1 Flavia Guevara RN   Coping skills         Relaxation techniques         Symptom management         Discharge planning   [address] [facility]    60 2 1 427 [facility]   60 1 1 volunteer   Recovery/AA/NA      volunteer   Physician medication management   15 7 1 Dr Camacho Lorenz NP   Family meeting/discharge planning   15 2 1400 [facility] and [place]

## 2020-07-24 NOTE — PROGRESS NOTES
Problem: Altered Thought Process (Adult/Pediatric)  Goal: *STG: Complies with medication therapy  7/24/2020 1555 by Derek Salazar  Outcome: Progressing Towards Goal     Problem: Altered Thought Process (Adult/Pediatric)  Goal: *STG: Decreased delusional thinking  Outcome: Not Progressing Towards Goal     Problem: Altered Thought Process (Adult/Pediatric)  Goal: Interventions  7/24/2020 1555 by Derek Salazar  Outcome: Progressing Towards Goal  Note: Pt has poor impulse control, delusional thoughts, paranoid. Not tolerating education or redirection. PRN medications required today.

## 2020-07-24 NOTE — INTERDISCIPLINARY ROUNDS
Behavioral Health Interdisciplinary Rounds     Patient Name: Skylar Corey  Age: 21 y.o. Room/Bed:  734/  Primary Diagnosis: <principal problem not specified>   Admission Status: Involuntary Commitment     Readmission within 30 days: no  Power of  in place: no  Patient requires a blocked bed:         Reason for blocked bed:   Sleep hours:  5      Participation in Care/Groups:  yes  Medication Compliant?: Yes  PRNS (last 24 hours): None    Restraints (last 24 hours):  no     Alcohol screening (AUDIT) completed -  AUDIT Score: 0  If applicable, date SBIRT discussed in treatment team AND documented:    Tobacco - patient is a smoker: Have You Used Tobacco in the Past 30 Days: No  Illegal Drugs use: Have You Used Any Illegal Substances Over the Past 12 Months: No    24 hour chart check complete: yes     Patient goal(s) for today: attend groups, take medications  Treatment team focus/goals: titrate medication, plan to check depakote level tomorrow, coordinate follow up. Progress note: Pt is alert, oriented, calmer and cooperative with treatment. He reported feeling sick to his stomach and being \"angry at oxygen in the atmosphere\" Plan to discharge Monday to the healing place.    Family Contact information: [address] Mother - [phone]  Patient's preferred phone number for follow up call :     LOS:  7  Expected LOS: TBD  Participating treatment team members: Skylar [place], Darcia Osgood, RN - Linda Marie, PharmD -ALFREDO Breaux

## 2020-07-24 NOTE — PROGRESS NOTES
Chief Complaint:  I am doing great. Length of Stay: 7 Days    Interval History:  Georgia Mcmullen says he is feeling great other than feeling nauseous. He continues to make progress. He has been appropriate in the unit, \"I just feel angry at the oxygen, me and him don't get along,\" but no aggression noted. Psychotic symptoms much improved. Denies si hi or ah. Nursing staff report no behavioral issues. Compliant with his meds and denies side effects. Past Medical History:  No past medical history on file. Labs:  Lab Results   Component Value Date/Time    WBC 13.1 (H) 07/17/2020 01:19 PM    HGB 13.5 07/17/2020 01:19 PM    HCT 38.8 07/17/2020 01:19 PM    PLATELET 250 93/63/6043 01:19 PM    MCV 86.2 07/17/2020 01:19 PM      Lab Results   Component Value Date/Time    Sodium 140 07/17/2020 01:19 PM    Potassium 3.4 (L) 07/17/2020 01:19 PM    Chloride 104 07/17/2020 01:19 PM    CO2 28 07/17/2020 01:19 PM    Anion gap 8 07/17/2020 01:19 PM    Glucose 111 (H) 07/17/2020 01:19 PM    BUN 10 07/17/2020 01:19 PM    Creatinine 1.06 07/17/2020 01:19 PM    BUN/Creatinine ratio 9 (L) 07/17/2020 01:19 PM    GFR est AA >60 07/17/2020 01:19 PM    GFR est non-AA >60 07/17/2020 01:19 PM    Calcium 9.0 07/17/2020 01:19 PM    Bilirubin, total 0.4 07/17/2020 01:19 PM    Alk.  phosphatase 76 07/17/2020 01:19 PM    Protein, total 7.9 07/17/2020 01:19 PM    Albumin 3.9 07/17/2020 01:19 PM    Globulin 4.0 07/17/2020 01:19 PM    A-G Ratio 1.0 (L) 07/17/2020 01:19 PM    ALT (SGPT) 27 07/17/2020 01:19 PM      Vitals:    07/23/20 0821 07/23/20 1603 07/23/20 1934 07/24/20 0801   BP: 128/76 101/67 112/73 120/74   Pulse: 86 92 80 73   Resp: 16 16 16 16   Temp: 98.1 °F (36.7 °C) 97.8 °F (36.6 °C) 98.3 °F (36.8 °C) 97.5 °F (36.4 °C)   SpO2: 100% 97% 97% 100%   Weight:       Height:             Current Facility-Administered Medications   Medication Dose Route Frequency Provider Last Rate Last Dose    OLANZapine (ZyPREXA) tablet 10 mg  10 mg Oral BID Ranny Jose A, NP   10 mg at 07/24/20 0809    divalproex ER (DEPAKOTE ER) 24 hour tablet 1,000 mg  1,000 mg Oral QHS Michelle Khane A, NP   1,000 mg at 07/23/20 2053    LORazepam (ATIVAN) injection 2 mg  2 mg IntraMUSCular Q4H PRN Lamar Khan, NP   2 mg at 07/21/20 1025    diphenhydrAMINE (BENADRYL) injection 50 mg  50 mg IntraMUSCular Q6H PRN Lamar Khan A, NP   50 mg at 07/21/20 1025    nicotine buccal (POLACRILEX) lozenge 2 mg  2 mg Oral Q2H PRN Alberto Ruelas NP   2 mg at 07/21/20 1447    neomycin-bacitracin-polymyxin (NEOSPORIN) ointment   Topical BID Maribel Najera NP        loratadine (CLARITIN) tablet 10 mg  10 mg Oral DAILY PRN Maribel Najera NP        OLANZapine (ZyPREXA) tablet 5 mg  5 mg Oral Q6H PRN Beatrice Forrest V, FNP   5 mg at 07/21/20 1710    haloperidol lactate (HALDOL) injection 5 mg  5 mg IntraMUSCular Q6H PRN Beatrice Forrest V, FNP   5 mg at 07/21/20 1102    benztropine (COGENTIN) tablet 1 mg  1 mg Oral BID PRN Beatrice Medina, FNP        hydrOXYzine HCL (ATARAX) tablet 50 mg  50 mg Oral TID PRN Beatrice Forrest V, FNP   50 mg at 07/22/20 1059    traZODone (DESYREL) tablet 50 mg  50 mg Oral QHS PRN Beatrice Medina, FNP        acetaminophen (TYLENOL) tablet 650 mg  650 mg Oral Q4H PRN Beatrice Forrest V, FNP        magnesium hydroxide (MILK OF MAGNESIA) 400 mg/5 mL oral suspension 30 mL  30 mL Oral DAILY PRN Carla Brush, FNP             Mental Status Exam:  Eye contact: good  Grooming: good  Psychomotor activity: relaxed  Speech is spontaneous  Mood is \"great\"  Affect:full  Perception: denies ah  Suicidal ideation: denies si  Cognition is grossly intact          Physical Exam:  Body habitus: Body mass index is 26.25 kg/m². Musculoskeletal system: normal gait  Tremor - neg  Cog wheeling - neg      Assessment and Plan:  Jude Daniel meets criteria for a diagnosis of Schizophrenia    Va level tomorrow evening.   Continue the medication regimen as prescribed  Disposition planning to continue. I certify that this patients inpatient psychiatric hospital services furnished since the previous certification were, and continue to be, required for treatment that could reasonably be expected to improve the patient's condition, or for diagnostic study, and that the patient continues to need, on a daily basis, active treatment furnished directly by or requiring the supervision of inpatient psychiatric facility personnel. In addition, the hospital records show that services furnished were intensive treatment services, admission or related services, or equivalent services.

## 2020-07-24 NOTE — GROUP NOTE
[facility] GROUP DOCUMENTATION INDIVIDUAL                                                                          Group Therapy Note    Date: 7/23/2020    Group Start Time: 2000  Group End Time: 2030  Group Topic: Reflection/Relaxation    [facility]    IP [address] GROUP DOCUMENTATION GROUP    Group Therapy Note    Attendees:          Attendance: Attended    Patient's Goal:  Unit orientation and daily progress    Interventions/techniques: Supported    Follows Directions: Followed directions    Interactions: Did not interact appropriately    Mental Status: Elevated and Labile    Behavior/appearance: Needed prompting    Goals Achieved: Able to engage in interactions and Able to listen to others      Additional Notes: On unit much of shift. Sociable with peers. Mood elevated. Observed dancing at times and singing loudly in room. Required redirection often including for inappropriate language and using staff computer.    [facility] [address]

## 2020-07-25 LAB — VALPROATE SERPL-MCNC: 77 UG/ML (ref 50–100)

## 2020-07-25 PROCEDURE — 80164 ASSAY DIPROPYLACETIC ACD TOT: CPT

## 2020-07-25 PROCEDURE — 65220000003 HC RM SEMIPRIVATE PSYCH

## 2020-07-25 PROCEDURE — 36415 COLL VENOUS BLD VENIPUNCTURE: CPT

## 2020-07-25 PROCEDURE — 74011250637 HC RX REV CODE- 250/637: Performed by: NURSE PRACTITIONER

## 2020-07-25 RX ORDER — OLANZAPINE 5 MG/1
20 TABLET ORAL
Status: DISCONTINUED | OUTPATIENT
Start: 2020-07-25 | End: 2020-07-27

## 2020-07-25 RX ORDER — OLANZAPINE 5 MG/1
10 TABLET ORAL DAILY
Status: DISCONTINUED | OUTPATIENT
Start: 2020-07-26 | End: 2020-07-27

## 2020-07-25 RX ORDER — DIVALPROEX SODIUM 500 MG/1
1500 TABLET, EXTENDED RELEASE ORAL
Status: DISCONTINUED | OUTPATIENT
Start: 2020-07-25 | End: 2020-07-30 | Stop reason: HOSPADM

## 2020-07-25 RX ADMIN — HYDROXYZINE HYDROCHLORIDE 50 MG: 50 TABLET ORAL at 21:03

## 2020-07-25 RX ADMIN — TRAZODONE HYDROCHLORIDE 50 MG: 50 TABLET ORAL at 21:03

## 2020-07-25 RX ADMIN — BACITRACIN ZINC NEOMYCIN SULFATE POLYMYXIN B SULFATE: 400; 3.5; 5 OINTMENT TOPICAL at 17:01

## 2020-07-25 RX ADMIN — BACITRACIN ZINC NEOMYCIN SULFATE POLYMYXIN B SULFATE: 400; 3.5; 5 OINTMENT TOPICAL at 08:30

## 2020-07-25 RX ADMIN — OLANZAPINE 5 MG: 5 TABLET, FILM COATED ORAL at 11:39

## 2020-07-25 RX ADMIN — OLANZAPINE 20 MG: 5 TABLET, FILM COATED ORAL at 21:04

## 2020-07-25 RX ADMIN — DIVALPROEX SODIUM 1500 MG: 500 TABLET, EXTENDED RELEASE ORAL at 21:03

## 2020-07-25 RX ADMIN — HYDROXYZINE HYDROCHLORIDE 50 MG: 50 TABLET ORAL at 11:39

## 2020-07-25 RX ADMIN — OLANZAPINE 10 MG: 5 TABLET, FILM COATED ORAL at 08:29

## 2020-07-25 NOTE — PROGRESS NOTES
Chief Complaint:  I am doing great. Length of Stay: 8 Days    Interval History:  Clau Friend is calm and pleasant during the interview. He is visible in the unit and interacts fairly well with staff. He is compliant with his meds and denies side effects. He had a rough evening yesterday, he was posturing, verbally threatening towards the nursing staff demanding to be released, and banging on the window, he was given atarax and po zyprexa at first but were ineffective, he eventually received prn im meds and it seemed to help. Denies si hi or avh. Past Medical History:  No past medical history on file. Labs:  Lab Results   Component Value Date/Time    WBC 13.1 (H) 07/17/2020 01:19 PM    HGB 13.5 07/17/2020 01:19 PM    HCT 38.8 07/17/2020 01:19 PM    PLATELET 410 04/19/2201 01:19 PM    MCV 86.2 07/17/2020 01:19 PM      Lab Results   Component Value Date/Time    Sodium 140 07/17/2020 01:19 PM    Potassium 3.4 (L) 07/17/2020 01:19 PM    Chloride 104 07/17/2020 01:19 PM    CO2 28 07/17/2020 01:19 PM    Anion gap 8 07/17/2020 01:19 PM    Glucose 111 (H) 07/17/2020 01:19 PM    BUN 10 07/17/2020 01:19 PM    Creatinine 1.06 07/17/2020 01:19 PM    BUN/Creatinine ratio 9 (L) 07/17/2020 01:19 PM    GFR est AA >60 07/17/2020 01:19 PM    GFR est non-AA >60 07/17/2020 01:19 PM    Calcium 9.0 07/17/2020 01:19 PM    Bilirubin, total 0.4 07/17/2020 01:19 PM    Alk.  phosphatase 76 07/17/2020 01:19 PM    Protein, total 7.9 07/17/2020 01:19 PM    Albumin 3.9 07/17/2020 01:19 PM    Globulin 4.0 07/17/2020 01:19 PM    A-G Ratio 1.0 (L) 07/17/2020 01:19 PM    ALT (SGPT) 27 07/17/2020 01:19 PM      Vitals:    07/24/20 0801 07/24/20 1630 07/24/20 2108 07/25/20 0735   BP: 120/74 136/90 114/81 116/81   Pulse: 73 82 93 76   Resp: 16 16 16 16   Temp: 97.5 °F (36.4 °C) 97.7 °F (36.5 °C) 98.4 °F (36.9 °C) 98 °F (36.7 °C)   SpO2: 100% 100%  99%   Weight:       Height:             Current Facility-Administered Medications   Medication Dose Route Frequency Provider Last Rate Last Dose    [START ON 7/26/2020] OLANZapine (ZyPREXA) tablet 10 mg  10 mg Oral DAILY Lamar Khan NP        OLANZapine (ZyPREXA) tablet 20 mg  20 mg Oral QHS Lamar Khan NP        divalproex ER (DEPAKOTE ER) 24 hour tablet 1,500 mg  1,500 mg Oral QHS Lamar Khan NP        LORazepam (ATIVAN) injection 2 mg  2 mg IntraMUSCular Q4H PRN Lamar Khan NP   2 mg at 07/24/20 1448    diphenhydrAMINE (BENADRYL) injection 50 mg  50 mg IntraMUSCular Q6H PRN Lamar Khan NP   50 mg at 07/24/20 1448    nicotine buccal (POLACRILEX) lozenge 2 mg  2 mg Oral Q2H PRN Steve Evans NP   2 mg at 07/21/20 1447    neomycin-bacitracin-polymyxin (NEOSPORIN) ointment   Topical BID Timoteo Ya NP        loratadine (CLARITIN) tablet 10 mg  10 mg Oral DAILY PRN Timoteo Ya NP        OLANZapine (ZyPREXA) tablet 5 mg  5 mg Oral Q6H PRN [place] Sand' V, FNP   5 mg at 07/25/20 1139    haloperidol lactate (HALDOL) injection 5 mg  5 mg IntraMUSCular Q6H PRN [place] Sand' V, FNP   5 mg at 07/24/20 1448    benztropine (COGENTIN) tablet 1 mg  1 mg Oral BID PRN Zbigniew Sand' V, FNP        hydrOXYzine HCL (ATARAX) tablet 50 mg  50 mg Oral TID PRN Zbigniew Sand' V, FNP   50 mg at 07/25/20 1139    traZODone (DESYREL) tablet 50 mg  50 mg Oral QHS PRN Zbigniew Montero, FNP        acetaminophen (TYLENOL) tablet 650 mg  650 mg Oral Q4H PRN Zbigniew Sand' V, FNP        magnesium hydroxide (MILK OF MAGNESIA) 400 mg/5 mL oral suspension 30 mL  30 mL Oral DAILY PRN Brook Kras, FNP             Mental Status Exam:  Eye contact: good  Grooming: good  Psychomotor activity: relaxed  Speech is spontaneous  Mood is \"great\"  Affect:full  Perception: denies ah  Suicidal ideation: denies si  Cognition is grossly intact          Physical Exam:  Body habitus: Body mass index is 26.25 kg/m².   Musculoskeletal system: normal gait  Tremor - neg  Cog batrolo meneses      Assessment and Plan:  Shiva Renteria meets criteria for a diagnosis of Schizophrenia    Va level this evening. Increase hs dose of zyprexa. Increase depakote to 1500mg. Continue the medication regimen as prescribed  Disposition planning to continue. I certify that this patients inpatient psychiatric hospital services furnished since the previous certification were, and continue to be, required for treatment that could reasonably be expected to improve the patient's condition, or for diagnostic study, and that the patient continues to need, on a daily basis, active treatment furnished directly by or requiring the supervision of inpatient psychiatric facility personnel. In addition, the hospital records show that services furnished were intensive treatment services, admission or related services, or equivalent services.

## 2020-07-25 NOTE — PROGRESS NOTES
Problem: Altered Thought Process (Adult/Pediatric)  Goal: *STG: Participates in treatment plan  Outcome: Not Progressing Towards Goal     Problem: Altered Thought Process (Adult/Pediatric)  Goal: *STG: Complies with medication therapy  Outcome: Progressing Towards Goal     Problem: Altered Thought Process (Adult/Pediatric)  Goal: *STG: Decreased delusional thinking  Outcome: Not Progressing Towards Goal     Problem: Altered Thought Process (Adult/Pediatric)  Goal: Interventions  Outcome: Progressing Towards Goal  Note: Paranoid suspicious.     1140- PRN Medication Documentation    Specific patient behavior that led to need for PRN medication: restless increasing agitation and impulsive behavior pt requesting his medication   Staff interventions attempted prior to PRN being given: education, coping skills, therapeutic communications   PRN medication given: 50 mg Atarax po 5 mg Zyprexa   Patient response/effectiveness of PRN medication: pt pacing yelling

## 2020-07-25 NOTE — PROGRESS NOTES
Problem: Altered Thought Process (Adult/Pediatric)  Goal: *STG: Participates in treatment plan  7/25/2020 1637 by Antonino Lord  Outcome: Progressing Towards Goal     Problem: Altered Thought Process (Adult/Pediatric)  Goal: *STG: Attends activities and groups  Outcome: Progressing Towards Goal     Problem: Altered Thought Process (Adult/Pediatric)  Goal: *STG: Decreased delusional thinking  7/25/2020 1637 by Antonino Lord  Outcome: Progressing Towards Goal     Problem: Altered Thought Process (Adult/Pediatric)  Goal: Interventions  7/25/2020 1637 by Antonino Lord  Outcome: Progressing Towards Goal  Note: Pt attends groups and activities. Attempting to use coping skills. Engaging with peers and staff. Poor insight, impaired judgement. Pt showered. Pt loud talking about rape and robbing people. Sexual preoccupation and inappropriate language; education provided, limit setting discussed. Pt verbalizes understanding. Pt attempting to use coping skills.

## 2020-07-25 NOTE — PROGRESS NOTES
Problem: Falls - Risk of  Goal: *Absence of Falls  Description: Document Bryan Mera Fall Risk and appropriate interventions in the flowsheet. Outcome: Progressing Towards Goal  Note: Fall Risk Interventions:  Mobility Interventions: Assess mobility with egress test         Medication Interventions: Teach patient to arise slowly    Elimination Interventions: Toilet paper/wipes in reach    History of Falls Interventions: Door open when patient unattended      Resting in bed with eyes closed, no complaints, no distress noted. Safety measures in place, will continue to monitor.

## 2020-07-25 NOTE — INTERDISCIPLINARY ROUNDS
Behavioral Health Interdisciplinary Rounds Patient Name: Erin Bradley  Age: 21 y.o. Room/Bed:  734/02 Primary Diagnosis: <principal problem not specified> Admission Status: Involuntary Commitment Readmission within 30 days: no 
Power of  in place: no 
Patient requires a blocked bed:         Reason for blocked bed:  
Sleep hours:  7 Participation in Care/Groups:   
Medication Compliant?: Yes PRNS (last 24 hours): Antipsychotic (PO), Antipsychotic (IM) and Antianxiety Restraints (last 24 hours):  no 
  
Alcohol screening (AUDIT) completed -  AUDIT Score: 0  If applicable, date SBIRT discussed in treatment team AND documented:   
Tobacco - patient is a smoker: Have You Used Tobacco in the Past 30 Days: No 
Illegal Drugs use: Have You Used Any Illegal Substances Over the Past 12 Months: No 
 
24 hour chart check complete: yes Patient goal(s) for today:  
Treatment team focus/goals:  
Progress note Family Contact information:  
Patient's preferred phone number for follow up call :  
 
LOS:  8  Expected LOS:  
 
Participating treatment team members: Erin Kirk, * (assigned SW),

## 2020-07-26 PROCEDURE — 74011250637 HC RX REV CODE- 250/637: Performed by: NURSE PRACTITIONER

## 2020-07-26 PROCEDURE — 65220000003 HC RM SEMIPRIVATE PSYCH

## 2020-07-26 RX ADMIN — OLANZAPINE 10 MG: 5 TABLET, FILM COATED ORAL at 09:15

## 2020-07-26 RX ADMIN — OLANZAPINE 5 MG: 5 TABLET, FILM COATED ORAL at 17:08

## 2020-07-26 RX ADMIN — DIVALPROEX SODIUM 1500 MG: 500 TABLET, EXTENDED RELEASE ORAL at 21:09

## 2020-07-26 RX ADMIN — HYDROXYZINE HYDROCHLORIDE 50 MG: 50 TABLET ORAL at 17:08

## 2020-07-26 RX ADMIN — HYDROXYZINE HYDROCHLORIDE 50 MG: 50 TABLET ORAL at 09:15

## 2020-07-26 RX ADMIN — TRAZODONE HYDROCHLORIDE 50 MG: 50 TABLET ORAL at 21:10

## 2020-07-26 RX ADMIN — OLANZAPINE 20 MG: 5 TABLET, FILM COATED ORAL at 21:09

## 2020-07-26 NOTE — BH NOTES
Blocked Bed Documentation:    Room number: 734-01  Type: Behavior  Rationale: Physical Aggression, poor self-control, threatening physical harm   Anticipated duration: hospital duration

## 2020-07-26 NOTE — PROGRESS NOTES
Chief Complaint:  I am doing great. Length of Stay: 9 Days    Interval History:  Romana Carbajal is calm and pleasant during the interview. He is visible in the unit and interacts fairly well with staff. He is compliant with his meds and denies side effects. No aggression in the last 24 hours. Denies si hi or avh. Va level-77, depakote recently increased. Past Medical History:  No past medical history on file. Labs:  Lab Results   Component Value Date/Time    WBC 13.1 (H) 07/17/2020 01:19 PM    HGB 13.5 07/17/2020 01:19 PM    HCT 38.8 07/17/2020 01:19 PM    PLATELET 296 78/93/6865 01:19 PM    MCV 86.2 07/17/2020 01:19 PM      Lab Results   Component Value Date/Time    Sodium 140 07/17/2020 01:19 PM    Potassium 3.4 (L) 07/17/2020 01:19 PM    Chloride 104 07/17/2020 01:19 PM    CO2 28 07/17/2020 01:19 PM    Anion gap 8 07/17/2020 01:19 PM    Glucose 111 (H) 07/17/2020 01:19 PM    BUN 10 07/17/2020 01:19 PM    Creatinine 1.06 07/17/2020 01:19 PM    BUN/Creatinine ratio 9 (L) 07/17/2020 01:19 PM    GFR est AA >60 07/17/2020 01:19 PM    GFR est non-AA >60 07/17/2020 01:19 PM    Calcium 9.0 07/17/2020 01:19 PM    Bilirubin, total 0.4 07/17/2020 01:19 PM    Alk.  phosphatase 76 07/17/2020 01:19 PM    Protein, total 7.9 07/17/2020 01:19 PM    Albumin 3.9 07/17/2020 01:19 PM    Globulin 4.0 07/17/2020 01:19 PM    A-G Ratio 1.0 (L) 07/17/2020 01:19 PM    ALT (SGPT) 27 07/17/2020 01:19 PM      Vitals:    07/24/20 2108 07/25/20 0735 07/25/20 1550 07/26/20 0826   BP: 114/81 116/81 117/70 108/77   Pulse: 93 76 93 86   Resp: 16 16 16 16   Temp: 98.4 °F (36.9 °C) 98 °F (36.7 °C) 97.9 °F (36.6 °C) 98.1 °F (36.7 °C)   SpO2:  99% 100% 100%   Weight:       Height:             Current Facility-Administered Medications   Medication Dose Route Frequency Provider Last Rate Last Dose    OLANZapine (ZyPREXA) tablet 10 mg  10 mg Oral DAILY Lamar Khan NP   10 mg at 07/26/20 0915    OLANZapine (ZyPREXA) tablet 20 mg  20 mg Oral QHS Lamar Khan, NP   20 mg at 07/25/20 2104    divalproex ER (DEPAKOTE ER) 24 hour tablet 1,500 mg  1,500 mg Oral QHS Michelle Khane A, NP   1,500 mg at 07/25/20 2103    LORazepam (ATIVAN) injection 2 mg  2 mg IntraMUSCular Q4H PRN Lamar Khan, NP   2 mg at 07/24/20 1448    diphenhydrAMINE (BENADRYL) injection 50 mg  50 mg IntraMUSCular Q6H PRN Lamar Khan, NP   50 mg at 07/24/20 1448    nicotine buccal (POLACRILEX) lozenge 2 mg  2 mg Oral Q2H PRN Noralysyl Wray, NP   2 mg at 07/21/20 1447    neomycin-bacitracin-polymyxin (NEOSPORIN) ointment   Topical BID Arelis Duarte, DIOGENES        loratadine (CLARITIN) tablet 10 mg  10 mg Oral DAILY PRN Arelis Duarte NP        OLANZapine (ZyPREXA) tablet 5 mg  5 mg Oral Q6H PRN Benay Saravanan' V, FNP   5 mg at 07/25/20 1139    haloperidol lactate (HALDOL) injection 5 mg  5 mg IntraMUSCular Q6H PRN Benay Saravanan' V, FNP   5 mg at 07/24/20 1448    benztropine (COGENTIN) tablet 1 mg  1 mg Oral BID PRN Benay Saravanan' V, FNP        hydrOXYzine HCL (ATARAX) tablet 50 mg  50 mg Oral TID PRN Benay Saravanan' V, FNP   50 mg at 07/26/20 0915    traZODone (DESYREL) tablet 50 mg  50 mg Oral QHS PRN Benay Saravanan' V, FNP   50 mg at 07/25/20 2103    acetaminophen (TYLENOL) tablet 650 mg  650 mg Oral Q4H PRN Benay Saravanan' V, FNP        magnesium hydroxide (MILK OF MAGNESIA) 400 mg/5 mL oral suspension 30 mL  30 mL Oral DAILY PRN Molly Pascal, FNP             Mental Status Exam:  Eye contact: good  Grooming: good  Psychomotor activity: relaxed  Speech is spontaneous  Mood is \"great\"  Affect:full  Perception: denies ah  Suicidal ideation: denies si  Cognition is grossly intact          Physical Exam:  Body habitus: Body mass index is 26.25 kg/m².   Musculoskeletal system: normal gait  Tremor - neg  Cog wheeling - neg      Assessment and Plan:  Last Mahoney meets criteria for a diagnosis of Schizophrenia    Continue the medication regimen as prescribed  Plan for dc in am.    I certify that this patients inpatient psychiatric hospital services furnished since the previous certification were, and continue to be, required for treatment that could reasonably be expected to improve the patient's condition, or for diagnostic study, and that the patient continues to need, on a daily basis, active treatment furnished directly by or requiring the supervision of inpatient psychiatric facility personnel. In addition, the hospital records show that services furnished were intensive treatment services, admission or related services, or equivalent services.

## 2020-07-26 NOTE — PROGRESS NOTES
Problem: Altered Thought Process (Adult/Pediatric)  Goal: *STG: Complies with medication therapy  Outcome: Progressing Towards Goal     Problem: Altered Thought Process (Adult/Pediatric)  Goal: *STG: Attends activities and groups  Outcome: Progressing Towards Goal     Problem: Altered Thought Process (Adult/Pediatric)  Goal: Interventions  Outcome: Progressing Towards Goal  Note: Pt alert resting in bed. NAD. Respirations are regular and unlabored. Eduction provided. Coping skills encouraged. Blocked Bed Documentation:    Room number: 797  Type: Behavior  Rationale: Physical Aggression  Anticipated duration: threatening others frequent prns  Additional comments:sexually aggressive    PRN Medication Documentation    Specific patient behavior that led to need for PRN medication: anxious pacing attempting to use coping skills.   Staff interventions attempted prior to PRN being given: education, coping skills, therapeutic communication   PRN medication given: po 50 mg Atarax  Patient response/effectiveness of PRN medication: improved concentration

## 2020-07-26 NOTE — PROGRESS NOTES
Problem: Altered Thought Process (Adult/Pediatric)  Goal: *STG: Participates in treatment plan  Outcome: Progressing Towards Goal     Problem: Altered Thought Process (Adult/Pediatric)  Goal: *STG: Complies with medication therapy  7/26/2020 1608 by Sybil Morales  Outcome: Progressing Towards Goal     Problem: Altered Thought Process (Adult/Pediatric)  Goal: *STG: Decreased delusional thinking  Outcome: Progressing Towards Goal     Problem: Altered Thought Process (Adult/Pediatric)  Goal: Interventions  7/26/2020 1608 by Julisa RONDON  Outcome: Progressing Towards Goal  Note: Pt is anxious. Attempting to use coping skills. Labile. Requires redirection and education. Medication meal complaint. Preferred activity: music     Pt reports feeling anxious about discharge restless attempting to use coping skills. Increased restlessness. Sexual preoccupation.   PRN Medication Documentation    Specific patient behavior that led to need for PRN medication: pacing cursing, see above   Staff interventions attempted prior to PRN being given: education, redirection, coping skills  PRN medication given: po 50 mg Atarax, po 5 mg zyprexa   Patient response/effectiveness of PRN medication: pt pacing unit talking to staff

## 2020-07-26 NOTE — BH NOTES
At 2055:  Patient making verbal threats to both RN and BHT, \"I don't want to go to penitentiary for fucking someone up up here! I will fucking fight but will not go to penitentiary for fucking up staff up here! Shut up bitch or I will fuck you up! I don't need to be told what the fuck to do, shut up and go back to work bitch! \"    Security called to make a tour. Patient has been harassing this staff member since admission. Patient says inappropriate sexual derogatory comments and then makes physical threats towards this staff member. He continues his crude gestures, such as burping out loud, breaking wind loudly on unit, sneezing and coughing without putting hand over mouth. Patient mouthing off to security during their unit tour, \"Fuck you punks! You know there is a triangle of higher ups, Nigger! \"     PRN Medication Documentation    Specific patient behavior that led to need for PRN medication: increased agitation with verbal physical threats toward staff  Staff interventions attempted prior to PRN being given: therapeutic listening, redirecting, and audience removed  PRN medication given: Trazodone, Zyprexa, and Atarax  Patient response/effectiveness of PRN medication: will monitor

## 2020-07-27 PROCEDURE — 65220000003 HC RM SEMIPRIVATE PSYCH

## 2020-07-27 PROCEDURE — 74011250637 HC RX REV CODE- 250/637: Performed by: NURSE PRACTITIONER

## 2020-07-27 PROCEDURE — 74011250636 HC RX REV CODE- 250/636: Performed by: NURSE PRACTITIONER

## 2020-07-27 RX ORDER — OLANZAPINE 5 MG/1
10 TABLET, ORALLY DISINTEGRATING ORAL ONCE
Status: ACTIVE | OUTPATIENT
Start: 2020-07-27 | End: 2020-07-27

## 2020-07-27 RX ORDER — OLANZAPINE 10 MG/1
TABLET ORAL
Qty: 90 TAB | Refills: 0 | Status: SHIPPED | OUTPATIENT
Start: 2020-07-27 | End: 2020-07-29

## 2020-07-27 RX ORDER — OLANZAPINE 5 MG/1
20 TABLET, ORALLY DISINTEGRATING ORAL 2 TIMES DAILY
Status: DISCONTINUED | OUTPATIENT
Start: 2020-07-27 | End: 2020-07-30 | Stop reason: HOSPADM

## 2020-07-27 RX ORDER — DIVALPROEX SODIUM 500 MG/1
1500 TABLET, EXTENDED RELEASE ORAL
Qty: 90 TAB | Refills: 0 | Status: SHIPPED | OUTPATIENT
Start: 2020-07-27 | End: 2020-07-29 | Stop reason: SDUPTHER

## 2020-07-27 RX ORDER — DIPHENHYDRAMINE HYDROCHLORIDE 50 MG/ML
50 INJECTION, SOLUTION INTRAMUSCULAR; INTRAVENOUS
Status: DISCONTINUED | OUTPATIENT
Start: 2020-07-27 | End: 2020-07-30 | Stop reason: HOSPADM

## 2020-07-27 RX ADMIN — OLANZAPINE 20 MG: 5 TABLET, ORALLY DISINTEGRATING ORAL at 21:02

## 2020-07-27 RX ADMIN — OLANZAPINE 10 MG: 5 TABLET, FILM COATED ORAL at 08:34

## 2020-07-27 RX ADMIN — DIPHENHYDRAMINE HYDROCHLORIDE 50 MG: 50 INJECTION, SOLUTION INTRAMUSCULAR; INTRAVENOUS at 11:35

## 2020-07-27 RX ADMIN — TRAZODONE HYDROCHLORIDE 50 MG: 50 TABLET ORAL at 21:02

## 2020-07-27 RX ADMIN — BACITRACIN ZINC NEOMYCIN SULFATE POLYMYXIN B SULFATE: 400; 3.5; 5 OINTMENT TOPICAL at 08:34

## 2020-07-27 RX ADMIN — DIVALPROEX SODIUM 1500 MG: 500 TABLET, EXTENDED RELEASE ORAL at 21:02

## 2020-07-27 RX ADMIN — LORAZEPAM 2 MG: 2 INJECTION INTRAMUSCULAR; INTRAVENOUS at 11:34

## 2020-07-27 RX ADMIN — HALOPERIDOL LACTATE 5 MG: 5 INJECTION, SOLUTION INTRAMUSCULAR at 11:34

## 2020-07-27 NOTE — GROUP NOTE
[facility] GROUP DOCUMENTATION INDIVIDUAL Group Therapy Note Date: 7/27/2020 Group Start Time: 9568 Group End Time: 1100 Group Topic: [employer] [address] Felicia Fee [facility] GROUP DOCUMENTATION GROUP Group Therapy Note Attendees: 4/7 Attendance: Attended Patient's Goal: Get a job, get money, help people Interventions/techniques: Informed, Provide feedback and Supported Follows Directions: Followed directions Interactions: Interacted appropriately Mental Status: Elevated Behavior/appearance: Cooperative Goals Achieved: Able to listen to others Additional Notes: Staff met with group to discuss unit and treatment team. Staff provided \"[facility] FAQ\" and reviewed how the unit functions and the roles of staff members. Staff also discussed treatment team and highlighted importance of being open in order to get the most out of the services offered here. Staff then discussed importance of setting daily goals in order to stay motivated and organized and to keep self-care in mind. Staff went around and asked everyone to share a goal they have for themselves for today or for once they're discharged. 
  
 
Phil Good

## 2020-07-27 NOTE — PROGRESS NOTES
Laboratory Monitoring for Divalproex/Valproic Acid: This patient is currently prescribed the following medication(s):   Current Facility-Administered Medications   Medication Dose Route Frequency    OLANZapine (ZyPREXA) tablet 10 mg  10 mg Oral DAILY    OLANZapine (ZyPREXA) tablet 20 mg  20 mg Oral QHS    divalproex ER (DEPAKOTE ER) 24 hour tablet 1,500 mg  1,500 mg Oral QHS    neomycin-bacitracin-polymyxin (NEOSPORIN) ointment   Topical BID     The following labs have been completed for monitoring of valproic acid:    Valproic Acid Serum Concentration  Lab Results   Component Value Date/Time    Valproic acid 77 07/25/2020 07:15 PM       Hepatic Function  Lab Results   Component Value Date/Time    Bilirubin, total 0.4 07/17/2020 01:19 PM    Protein, total 7.9 07/17/2020 01:19 PM    Albumin 3.9 07/17/2020 01:19 PM    Globulin 4.0 07/17/2020 01:19 PM    A-G Ratio 1.0 (L) 07/17/2020 01:19 PM    ALT (SGPT) 27 07/17/2020 01:19 PM    AST (SGOT) 35 07/17/2020 01:19 PM    Alk. phosphatase 76 07/17/2020 01:19 PM     Hematology  Lab Results   Component Value Date/Time    WBC 13.1 (H) 07/17/2020 01:19 PM    RBC 4.50 07/17/2020 01:19 PM    HGB 13.5 07/17/2020 01:19 PM    HCT 38.8 07/17/2020 01:19 PM    MCV 86.2 07/17/2020 01:19 PM    MCH 30.0 07/17/2020 01:19 PM    MCHC 34.8 07/17/2020 01:19 PM    RDW 12.9 07/17/2020 01:19 PM    PLATELET 174 27/97/2076 01:19 PM     Assessment/Plan:  A valproic acid level was drawn on 7/25 @ 1915 and found to be 77 mcg/mL. This level is reflective of a steady state trough level. Based on this level, the dose was increased to 1500mg ER QHS.      Laya Dietrich, PharmD, BCPP, [facility], [facility]

## 2020-07-27 NOTE — PROGRESS NOTES
Problem: Altered Thought Process (Adult/Pediatric)  Goal: *STG: Participates in treatment plan  Outcome: Progressing Towards Goal  Goal: *STG: Complies with medication therapy  Outcome: Progressing Towards Goal  Goal: *STG: Decreased delusional thinking  Outcome: Progressing Towards Goal    Patient is present and interactive in conversation appropriately with others in the dining room. Medication compliant. Participates in treatment plan. Focus on his personal hygiene and discharge. Denies si/hi and auditory hallucinations.

## 2020-07-27 NOTE — PROGRESS NOTES
1110 Patient overheard on phone call \"that he planned to kill his mother and family when he left here. \"S Bill NP cancelled discharge          Pt denies any suicidal or homicidal thoughts. Contracts for safety. Remains on q 15 min safety checks. DISCHARGE SUMMARY from Nurse    PATIENT INSTRUCTIONS:    What to do at Home:  Recommended activity: Activity as tolerated,     *  Please give a list of your current medications to your Primary Care Provider. *  Please update this list whenever your medications are discontinued, doses are      changed, or new medications (including over-the-counter products) are added. *  Please carry medication information at all times in case of emergency situations. These are general instructions for a healthy lifestyle:    No smoking/ No tobacco products/ Avoid exposure to second hand smoke  Surgeon General's Warning:  Quitting smoking now greatly reduces serious risk to your health. Obesity, smoking, and sedentary lifestyle greatly increases your risk for illness    A healthy diet, regular physical exercise & weight monitoring are important for maintaining a healthy lifestyle    You may be retaining fluid if you have a history of heart failure or if you experience any of the following symptoms:  Weight gain of 3 pounds or more overnight or 5 pounds in a week, increased swelling in our hands or feet or shortness of breath while lying flat in bed. Please call your doctor as soon as you notice any of these symptoms; do not wait until your next office visit. The discharge information has been reviewed with the patient. The patient verbalized understanding. Discharge medications reviewed with the patient and appropriate educational materials and side effects teaching were provided. Patient received all valuables and belongings. Transportation provided to discharge location.   ___________________________________________________________________________________________________________________________________  Problem: Altered Thought Process (Adult/Pediatric)  Goal: *STG: Participates in treatment plan  Outcome: Resolved/Not Met  Goal: *STG: Complies with medication therapy  Outcome: Resolved/Not Met  Goal: *STG: Attends activities and groups  Outcome: Resolved/Not Met  Goal: *STG: Decreased delusional thinking  Outcome: Resolved/Not Met  Goal: *STG: Decreased hallucinations  Outcome: Resolved/Not Met  Goal: Interventions  Outcome: Resolved/Not Met

## 2020-07-27 NOTE — PROGRESS NOTES
Pt out on unit for dinner, otherwise spending most of time in room. Reports feeling dizzy from injections he had earlier. Pt educated on fall risk, cautioned to stand up slowly. Pt has been calm and cooperative.      Problem: Altered Thought Process (Adult/Pediatric)  Goal: *STG: Participates in treatment plan  Outcome: Progressing Towards Goal  Goal: *STG: Remains safe in hospital  Outcome: Progressing Towards Goal

## 2020-07-27 NOTE — BH NOTES
PRN Medication Documentation  At 11 35  Specific patient behavior that led to need for PRN medication: agitation, yelling, cursing, posturing, threatening staff  Staff interventions attempted prior to PRN being given: redirection, attempted reasoning  PRN medication given: Benadryl 50 mg IM; Haldol 5 mg IM; Ativan 2 mg IM  Patient response/effectiveness of PRN medication: pending      Patient was anticipating and planning for discharge following treatment team. During treatment team patient was informed that he would be discharged to [facility] point of entry, that he was not allowed to return to mothers home. Patient then used patient phone to call mother. Patient was heard to make homicidal threat to mother \"I'm gonna kill you and all you fuckin yall\" by  staff. Discharge was then cancelled, security notified. Patient notified that he was not discharging today due to the homicidal threat. Patient began screaming obscenities, threatening staff, posturing, Popping his fist at  staff's back.  Security arrived, patient escorted to room, IM medication administered

## 2020-07-27 NOTE — BH NOTES
Blocked Bed Documentation:    Room number: 734-01  Type: Behavior  Rationale: Poor Self-Control, impulsive, verbal threats of physical harm to others/staff  Anticipated duration: hospital duration or Tx team decision  Additional comments:physical aggression with verbal threats

## 2020-07-27 NOTE — PROGRESS NOTES
Pharmacist Discharge Medication Reconciliation    Discharging Provider: Angelo Torres NP    Significant PMH: No past medical history on file. Chief Complaint for this Admission: No chief complaint on file. Allergies: Patient has no known allergies. Discharge Medications:   Current Discharge Medication List        START taking these medications    Details   divalproex ER (DEPAKOTE ER) 500 mg ER tablet Take 3 Tabs by mouth nightly. Indications: mood  Qty: 90 Tab, Refills: 0      OLANZapine (ZyPREXA) 10 mg tablet Take 1 tablet in the morning and 2 tablets at bedtime.   Indications: schizophrenia  Qty: [address] Tab, Refills: 0           The patient's chart, MAR and AVS were reviewed by Consuelo Sanchez PHARMD.

## 2020-07-27 NOTE — DISCHARGE SUMMARY
PSYCHIATRIC DISCHARGE SUMMARY    Patient: Ronak Ochoa MRN: [id number]  SSN: [id number]    YOB: 1995  Age: 21 y.o. Sex: male        Date of Admission: 7/17/2020  Date of Discharge:7/27/2020      Type of Discharge:  REGULAR     Admission data:  CHIEF COMPLAINT:  \"I am well\"        HISTORY OF PRESENTING COMPLAINT:  Ronak Ochoa is a 21 y.o. BLACK OR  male who is currently admitted to the psychiatric floor at [facility]. Patient endorses he was brought in the hospital against his will and states, Moise Jimenez all wanted me out and wanted me to burn outside my room. \" Patient endorses he was staying at a room and someone called the police on him. He endorses there was a crowd of people laughing at him and the laughs got louder and louder. Police were called and the patient got in front seat and refused to get out of the passenger side. He was an ECO and was brought into the hospital from [facility]. Patient is bizarre and paranoid during assessment. Mood can be labile at times and yesterday he refused to meet with treatment team. He denies SI/HI/AVH but was observed responding to internal stimuli. Patient is poor historian and can not remember why or how he ended up at a hotel room. Patient will be reevaluated Monday by treatment team.            PAST PSYCHIATRIC HISTORY and SUBSTANCE ABUSE HISTORY:  Denies any previous psychiatric hospitalizations. Endorses previous substance abuse of THC        PAST MEDICAL HISTORY:  Please see H&P for details. No past medical history on file. Prior to Admission medications    Medication Sig Start Date End Date Taking? Authorizing Provider   naproxen (NAPROSYN) 500 mg tablet TAKE 1 TABLET BY MOUTH EVERY DAY AS NEEDED HEADACHE 8/6/19     Yasmin HOWE NP   ergocalciferol (ERGOCALCIFEROL) 50,000 unit capsule Take 1 Cap by mouth every seven (7) days.  6/16/19     Yoana Shrestha NP                Lab Results   Component Value Date/Time     WBC 13.1 (H) 07/17/2020 01:19 PM     HGB 13.5 07/17/2020 01:19 PM     HCT 38.8 07/17/2020 01:19 PM     PLATELET 869 81/82/0611 01:19 PM     MCV 86.2 07/17/2020 01:19 PM            Lab Results   Component Value Date/Time     Sodium 140 07/17/2020 01:19 PM     Potassium 3.4 (L) 07/17/2020 01:19 PM     Chloride 104 07/17/2020 01:19 PM     CO2 28 07/17/2020 01:19 PM     Anion gap 8 07/17/2020 01:19 PM     Glucose 111 (H) 07/17/2020 01:19 PM     BUN 10 07/17/2020 01:19 PM     Creatinine 1.06 07/17/2020 01:19 PM     BUN/Creatinine ratio 9 (L) 07/17/2020 01:19 PM     GFR est AA >60 07/17/2020 01:19 PM     GFR est non-AA >60 07/17/2020 01:19 PM     Calcium 9.0 07/17/2020 01:19 PM     Bilirubin, total 0.4 07/17/2020 01:19 PM     Alk. phosphatase 76 07/17/2020 01:19 PM     Protein, total 7.9 07/17/2020 01:19 PM     Albumin 3.9 07/17/2020 01:19 PM     Globulin 4.0 07/17/2020 01:19 PM     A-G Ratio 1.0 (L) 07/17/2020 01:19 PM     ALT (SGPT) 27 07/17/2020 01:19 PM             Vitals:     07/19/20 1609 07/20/20 0802 07/20/20 1552 07/20/20 1938   BP: 98/65 129/77 122/79 124/85   Pulse: 82 77 84 73   Resp: 14 16 16 16   Temp: 97.7 °F (36.5 °C) 97.9 °F (36.6 °C) 98.3 °F (36.8 °C)     SpO2: 100% 100% 100% 100%   Weight:           Height:                  PSYCHOSOCIAL HISTORY:  Endorses he lives alone   No children        MENTAL STATUS EXAM:  General appearance: psychomotor activity is calm  Eye contact: Fair eye contact  Speech: Spontaneous  Affect : Constricted  Mood: Labile  Thought Process: Paranoid/Grandiose  Perception: Denies AH or VH.    Thought Content:Denies SI or Plan  Insight: Poor  Judgement: Poor  Cognition: Impaired         ASSESSMENT AND PLAN:  Ronak Ochoa meets criteria for a diagnosis of  Schizophrenia         Hospital Course:    Patient was admitted to the Psychiatric services for acute psychiatric stabilization in regards to symptomatology as described in the HPI above and placed on Q15 minute checks and suicide  precautions. Standing medications were ordered. He was started on zyprexa and depakote. While on the unit Colby Villalobos was involved in individual, group, occupational and milieu therapy. He improved gradually and was able to integrate into the milieu with help from the nursing staff. Patients symptoms improved gradually including psychosis, mood instability. He was appropriate in his interactions, and cooperative with medications and the unit routine. Please see individual progress notes for more specific details regarding patient's hospitalization course. Patient was discharged as per the plan. He had been doing well on the unit as per the report of the nursing staff and my observations. No PRN medication for agitation, seclusion or restraints were required during the last 48 hours of his stay. Colby Villalobos had improved progressively to the point of being stable for discharge and outpatient FU. At this time he did not offer any complaints. Patient denied any SI or HI. Denied any AH or VH. He denied any delusions. Was not considered a danger to self or to others and is safe for discharge. Will FU with his appointments and remains motivated to be in treatment. The patient verbalized understanding of his discharge instructions. Some parts of the discharge summary are from the initial Psychiatric interview that was done on admission by the admitting psychiatrist.       Allergies:(reviewed/updated 7/27/2020)  No Known Allergies    Side Effects: (reviewed/updated 7/27/2020)  None reported or admitted to.     Vital Signs:  Patient Vitals for the past 24 hrs:   Temp Pulse Resp BP SpO2   07/27/20 0737 98.4 °F (36.9 °C) 75 16 (!) 144/101 100 %   07/26/20 1548 97.9 °F (36.6 °C) 96 16 118/86 99 %     Wt Readings from Last 3 Encounters:   07/19/20 85.4 kg (188 lb 3.2 oz)   07/17/20 93 kg (205 lb)   06/13/19 93 kg (205 lb)     Temp Readings from Last 3 Encounters:   07/27/20 98.4 °F (36.9 °C) 07/17/20 98.2 °F (36.8 °C)   06/13/19 98.6 °F (37 °C) (Oral)     BP Readings from Last 3 Encounters:   07/27/20 (!) 144/101   07/17/20 132/84   06/13/19 119/81     Pulse Readings from Last 3 Encounters:   07/27/20 75   07/17/20 76   06/13/19 77       Labs: (reviewed/updated 7/27/2020)  No results found for this or any previous visit (from the past 24 hour(s)). Lab Results   Component Value Date/Time    Valproic acid 77 07/25/2020 07:15 PM     No results found for: [facility] [facility] EVALUATION [place]    Radiology (reviewed/updated 7/27/2020)  No results found. Mental Status Exam on Discharge:  General appearance:   Anna Romeo is a 21 y.o. BLACK OR  male who is well groomed, psychomotor activity is WNL  Eye contact: makes good eye contact  Speech: Spontaneous and coherent  Affect : Euthymic  Mood: \"OK\"  Thought Process: Logical, goal directed  Perception: Denies any AH or VH. Thought Content: Denies any SI or Plan  Insight: Partial  Judgement: Fair  Cognition: Intact grossly. Discharge Diagnosis:    Schizophrenia         Current Discharge Medication List      START taking these medications    Details   divalproex ER (DEPAKOTE ER) 500 mg ER tablet Take 3 Tabs by mouth nightly. Indications: mood  Qty: 90 Tab, Refills: 0      OLANZapine (ZyPREXA) 10 mg tablet Take 1 tablet in the morning and 2 tablets at bedtime. Indications: schizophrenia  Qty: 80 Tab, Refills: 0                  Follow-up Information     Follow up With Specialties Details Why [address]  Call Call [phone] Monday -Friday 8am-2pm and ask to open a case for mental health services. You will complete your intake via telephone and will be connected with psychiatric providers. [address] [facility]  [phone]    The [facility]  Go on 7/27/2020 walk into [facility] and ask to compelte an intake for housing and recovery services.   [address] [facility]  phone ([phone])    None    None (395) Patient stated that they have no PCP          WOUND CARE: none needed. Prognosis:   Good / Caryle Patee based on nature of patient's pathology/ies and treatment compliance issues. Prognosis is greatly dependent upon patient's ability to  follow up on psychiatric/psychotherapy appointments as well as to comply with psychiatric medications as prescribed. I certify that this patients inpatient psychiatric hospital services furnished since the previous certification were, and continue to be, required for treatment that could reasonably be expected to improve the patient's condition, or for diagnostic study, and that the patient continues to need, on a daily basis, active treatment furnished directly by or requiring the supervision of inpatient psychiatric facility personnel. In addition, the hospital records show that services furnished were intensive treatment services, admission or related services, or equivalent services.      Signed:  Constantin Nova NP  7/27/2020

## 2020-07-27 NOTE — INTERDISCIPLINARY ROUNDS
Behavioral Health Interdisciplinary Rounds Patient Name: Orville De La Torre  Age: 21 y.o. Room/Bed:  734/02 Primary Diagnosis: <principal problem not specified> Admission Status: Involuntary Commitment Readmission within 30 days: no 
Power of  in place: no 
Patient requires a blocked bed:         Reason for blocked bed:  
Sleep hours: 8 Participation in Care/Groups:   
Medication Compliant?: Yes PRNS (last 24 hours): Antipsychotic (PO), Antianxiety and Sleep Aid Restraints (last 24 hours):  no 
  
Alcohol screening (AUDIT) completed -  AUDIT Score: 0  If applicable, date SBIRT discussed in treatment team AND documented:   
Tobacco - patient is a smoker: Have You Used Tobacco in the Past 30 Days: No 
Illegal Drugs use: Have You Used Any Illegal Substances Over the Past 12 Months: No 
 
24 hour chart check complete: yes Patient goal(s) for today: prepare for discharge Treatment team focus/goals: reconcile medications and facilitate discharge Progress note: Pt is alert, oriented, clam, and cooperative  during treatment team but discharge was delayed when patient became agitated and started making HI statement. MSW informed mother of delayed discharge and encouraged Pt's mother to get a restraining order as he expressed HI towards her when they were on the phone earlier today. Family Contact information: [address] Mother - [phone] Patient's preferred phone number for follow up call : [phone] LOS:  10  Expected LOS: TBD Participating treatment team members: Jose F Estrella, NP - Lucila Young, PharmD -ALFREDO Grimm

## 2020-07-28 PROCEDURE — 74011250637 HC RX REV CODE- 250/637: Performed by: NURSE PRACTITIONER

## 2020-07-28 PROCEDURE — 65220000003 HC RM SEMIPRIVATE PSYCH

## 2020-07-28 RX ORDER — CHLORPROMAZINE HYDROCHLORIDE 25 MG/1
50 TABLET, FILM COATED ORAL
Status: DISCONTINUED | OUTPATIENT
Start: 2020-07-28 | End: 2020-07-28

## 2020-07-28 RX ORDER — CHLORPROMAZINE HYDROCHLORIDE 25 MG/ML
50 INJECTION INTRAMUSCULAR
Status: DISCONTINUED | OUTPATIENT
Start: 2020-07-28 | End: 2020-07-30 | Stop reason: HOSPADM

## 2020-07-28 RX ADMIN — BACITRACIN ZINC NEOMYCIN SULFATE POLYMYXIN B SULFATE: 400; 3.5; 5 OINTMENT TOPICAL at 17:03

## 2020-07-28 RX ADMIN — OLANZAPINE 5 MG: 5 TABLET, FILM COATED ORAL at 17:30

## 2020-07-28 RX ADMIN — DIVALPROEX SODIUM 1500 MG: 500 TABLET, EXTENDED RELEASE ORAL at 21:36

## 2020-07-28 RX ADMIN — OLANZAPINE 20 MG: 5 TABLET, ORALLY DISINTEGRATING ORAL at 08:26

## 2020-07-28 RX ADMIN — BACITRACIN ZINC NEOMYCIN SULFATE POLYMYXIN B SULFATE: 400; 3.5; 5 OINTMENT TOPICAL at 09:02

## 2020-07-28 RX ADMIN — HYDROXYZINE HYDROCHLORIDE 50 MG: 50 TABLET ORAL at 16:30

## 2020-07-28 RX ADMIN — OLANZAPINE 20 MG: 5 TABLET, ORALLY DISINTEGRATING ORAL at 22:04

## 2020-07-28 NOTE — BH NOTES
Blocked Bed Documentation:    Room number: 498  Type: Behavior  Rationale: Physical Aggression  Anticipated duration: length of stay  Additional comments: per physician

## 2020-07-28 NOTE — PROGRESS NOTES
Problem: Altered Thought Process (Adult/Pediatric)  Goal: *STG: Participates in treatment plan  Outcome: Progressing Towards Goal  Goal: *STG: Remains safe in hospital  Outcome: Progressing Towards Goal  Goal: *STG: Seeks staff when feelings of anxiety and fear arise  Outcome: Progressing Towards Goal  Goal: *STG: Complies with medication therapy  Outcome: Progressing Towards Goal  Goal: *STG: Absence of lethality  Outcome: Progressing Towards Goal  Pt complies with medications  Pt is able to follow directions. He has refrained from intimidating or threatening behaviors.

## 2020-07-28 NOTE — PROGRESS NOTES
Problem: Aggression and Hostility (Behavioral Health)  Goal: *Reduce intensity and frequency of aggressive behaviors and verbalizations, treating others with respect  Outcome: Progressing Towards Goal  Goal: *Identify early warning signs of explosive outbursts or catastrophic reaction  Outcome: Progressing Towards Goal  Goal: *Avoid situations that produce feelings of frustration, embarrassment, anxiety, or impatience  Outcome: Progressing Towards Goal  Goal: *Identify alternative ways to cope with assaultive behavior  Outcome: Progressing Towards Goal  Goal: *Express anger or hostility appropriately (without verbal or physical aggression)  Outcome: Progressing Towards Goal  Goal: *Demonstrate ability to comply with simple direction  Outcome: Progressing Towards Goal   At present pt is engaged on unit  At present pt appears less agitated,will continue to observe for escalation

## 2020-07-28 NOTE — PROGRESS NOTES
Chief Complaint:  My mom pissed me off. Length of Stay: 11 Days    Interval History:  Keenan's discharge was cancelled yesterday after an MHT overheard him on the phone while talking to his mother that he will kill her. He was then given prn im meds. Nursing staff report he was intimidating both patients and nursing staff yesterday and was agitated. During rounds, he is much calmer, smiling, says he is doing ok but would like to go home. He states that his mother Claudeen James" him off so he made those threats against her. \"I say bad things when I am angry and my mother is aware of that, but I really don't mean it\". Her mother shared that patient has anger issues and has been aware of those homicidal threats that he makes against her when he is angry. Duty to warn was in placed. CM also encouraged him to file for restraining order. Patient is redirectable during rounds, his zyprexa was increased yesterday. He denies side effects with any of his meds. He denies si hi or avh. We will continue to monitor his behavior for the next few days. Past Medical History:  No past medical history on file. Labs:  Lab Results   Component Value Date/Time    WBC 13.1 (H) 07/17/2020 01:19 PM    HGB 13.5 07/17/2020 01:19 PM    HCT 38.8 07/17/2020 01:19 PM    PLATELET 200 42/90/3031 01:19 PM    MCV 86.2 07/17/2020 01:19 PM      Lab Results   Component Value Date/Time    Sodium 140 07/17/2020 01:19 PM    Potassium 3.4 (L) 07/17/2020 01:19 PM    Chloride 104 07/17/2020 01:19 PM    CO2 28 07/17/2020 01:19 PM    Anion gap 8 07/17/2020 01:19 PM    Glucose 111 (H) 07/17/2020 01:19 PM    BUN 10 07/17/2020 01:19 PM    Creatinine 1.06 07/17/2020 01:19 PM    BUN/Creatinine ratio 9 (L) 07/17/2020 01:19 PM    GFR est AA >60 07/17/2020 01:19 PM    GFR est non-AA >60 07/17/2020 01:19 PM    Calcium 9.0 07/17/2020 01:19 PM    Bilirubin, total 0.4 07/17/2020 01:19 PM    Alk.  phosphatase 76 07/17/2020 01:19 PM    Protein, total 7.9 07/17/2020 01:19 PM    Albumin 3.9 07/17/2020 01:19 PM    Globulin 4.0 07/17/2020 01:19 PM    A-G Ratio 1.0 (L) 07/17/2020 01:19 PM    ALT (SGPT) 27 07/17/2020 01:19 PM      Vitals:    07/27/20 0737 07/27/20 1547 07/27/20 1938 07/28/20 0847   BP: (!) 144/101 111/72 132/90 123/71   Pulse: 75 98 (!) 103 (!) 101   Resp: 16 16 16 16   Temp: 98.4 °F (36.9 °C) 97.8 °F (36.6 °C) 97.6 °F (36.4 °C) 98.3 °F (36.8 °C)   SpO2: 100% 100% 99% 100%   Weight:       Height:             Current Facility-Administered Medications   Medication Dose Route Frequency Provider Last Rate Last Dose    chlorproMAZINE (THORAZINE) tablet 50 mg  50 mg Oral Q6H PRN Lamar Khan NP        OLANZapine (ZyPREXA zydis) disintegrating tablet 20 mg  20 mg Oral BID Lamar Khan NP   20 mg at 07/28/20 0826    diphenhydrAMINE (BENADRYL) injection 50 mg  50 mg IntraMUSCular Q6H PRN Lamar Khan NP   50 mg at 07/27/20 1135    divalproex ER (DEPAKOTE ER) 24 hour tablet 1,500 mg  1,500 mg Oral QHS Lamar Khan NP   1,500 mg at 07/27/20 2102    LORazepam (ATIVAN) injection 2 mg  2 mg IntraMUSCular Q4H PRN Lamar Khan NP   2 mg at 07/27/20 1134    nicotine buccal (POLACRILEX) lozenge 2 mg  2 mg Oral Q2H PRN Manuela Krueger NP   2 mg at 07/21/20 1447    neomycin-bacitracin-polymyxin (NEOSPORIN) ointment   Topical BID Garnette Penta, NP        loratadine (CLARITIN) tablet 10 mg  10 mg Oral DAILY PRN Abhilash Penta, NP        OLANZapine (ZyPREXA) tablet 5 mg  5 mg Oral Q6H PRN Arun Landon V, FNP   5 mg at 07/26/20 1708    benztropine (COGENTIN) tablet 1 mg  1 mg Oral BID PRN LUIS Victor        hydrOXYzine HCL (ATARAX) tablet 50 mg  50 mg Oral TID PRN Arun Landon V, FNP   50 mg at 07/26/20 1708    traZODone (DESYREL) tablet 50 mg  50 mg Oral QHS PRN JOSS VictorP   50 mg at 07/27/20 2102    acetaminophen (TYLENOL) tablet 650 mg  650 mg Oral Q4H PRN LUIS Rosenberg        magnesium hydroxide (MILK OF MAGNESIA) 400 mg/5 mL oral suspension 30 mL  30 mL Oral DAILY PRN Brittany Roca, FNP             Mental Status Exam:  Eye contact: good  Grooming: good  Psychomotor activity: relaxed  Speech is spontaneous  Mood is \"great\"  Affect:full  Perception: denies ah  Suicidal ideation: denies si  Cognition is grossly intact          Physical Exam:  Body habitus: Body mass index is 26.25 kg/m². Musculoskeletal system: normal gait  Tremor - neg  Cog wheeling - neg      Assessment and Plan:  Yamila Napier meets criteria for a diagnosis of Schizophrenia    Continue the medication regimen as prescribed  Disposition planning to the [facility] place. I certify that this patients inpatient psychiatric hospital services furnished since the previous certification were, and continue to be, required for treatment that could reasonably be expected to improve the patient's condition, or for diagnostic study, and that the patient continues to need, on a daily basis, active treatment furnished directly by or requiring the supervision of inpatient psychiatric facility personnel. In addition, the hospital records show that services furnished were intensive treatment services, admission or related services, or equivalent services.

## 2020-07-28 NOTE — INTERDISCIPLINARY ROUNDS
Behavioral Health Interdisciplinary Rounds Patient Name: Jacinda Baez  Age: 21 y.o. Room/Bed:  734/02 Primary Diagnosis: <principal problem not specified> Admission Status: Involuntary Commitment Readmission within 30 days: no 
Power of  in place: no 
Patient requires a blocked bed: yes        Reason for blocked bed: behavior Sleep hours: 6.5 Participation in Care/Groups:   
Medication Compliant?: Yes PRNS (last 24 hours): Antipsychotic (IM), Antianxiety, Anticholinergic and Sleep Aid Restraints (last 24 hours):  no 
  
Alcohol screening (AUDIT) completed -  AUDIT Score: 0  If applicable, date SBIRT discussed in treatment team AND documented:   
Tobacco - patient is a smoker: Have You Used Tobacco in the Past 30 Days: No 
Illegal Drugs use: Have You Used Any Illegal Substances Over the Past 12 Months: No 
 
24 hour chart check complete: yes Patient goal(s) for today: attend groups, take medications Treatment team focus/goals: titrate medication, coordinate follow up Progress note: Pt is alert, oriented and engaged with treatment team. He recounts events from yesterday and admits to HI. He has been posturing and trying to intimidate staff. He is disorganized and told NP she lives with his mom. Family Contact information: Trevon Huff/[address] MSW spoke with mother again regarding delayed discharge until early next week. MSW encouraged restraining order. Patient's preferred phone number for follow up call : [phone] LOS:  10  Expected LOS: TBD 
  
Participating treatment team members: Asad Wolf, NP - Lucila Koroma, PharmD -ALFREDO Alcala

## 2020-07-28 NOTE — PROGRESS NOTES
Problem: Altered Thought Process (Adult/Pediatric)  Goal: *STG: Participates in treatment plan  Outcome: Progressing Towards Goal  Goal: *STG: Seeks staff when feelings of anxiety and fear arise  Outcome: Progressing Towards Goal  Goal: *STG: Complies with medication therapy  Outcome: Progressing Towards Goal    Patient has been present within unit today. Expresses needs to staff appropriately. Medication compliant. Patient was appropriate during treatment team, was redirectable by  staff. Requested to shave, supervised by  staff to accomplish the task.

## 2020-07-28 NOTE — BH NOTES
PRN Medication Documentation    Specific patient behavior that led to need for PRN medication:restless,anxious Staff interventions attempted prior to PRN being given:redirection  PRN medication given:atarax,zyprexa-1730 see mar  Patient response/effectiveness of PRN medication: tl aware

## 2020-07-28 NOTE — PROGRESS NOTES
Problem: Discharge Planning  Goal: *Knowledge of medication management  Description: Patient is taking medications as prescribe.d   Outcome: Progressing Towards Goal  Note: Patient is taking medications. Goal: *Knowledge of discharge instructions  Description: Patient verbalizes understanding of goals for safe discharge and treatment. Outcome: Progressing Towards Goal  Note: Patient verbalizes understanding goals for treatment and safe discharge. Problem: Discharge Planning  Goal: *Discharge to safe environment  Description: Patient is homeless and does not identify alternative safe housing. He has been educated on homeless resources in [place] and will be connected upon discharge. Outcome: Not Progressing Towards Goal  Note: Patient is homeless and does not identify alternative safe housing.

## 2020-07-29 LAB — VALPROATE SERPL-MCNC: 95 UG/ML (ref 50–100)

## 2020-07-29 PROCEDURE — 36415 COLL VENOUS BLD VENIPUNCTURE: CPT

## 2020-07-29 PROCEDURE — 74011250637 HC RX REV CODE- 250/637: Performed by: NURSE PRACTITIONER

## 2020-07-29 PROCEDURE — 65220000003 HC RM SEMIPRIVATE PSYCH

## 2020-07-29 PROCEDURE — 80164 ASSAY DIPROPYLACETIC ACD TOT: CPT

## 2020-07-29 RX ORDER — OLANZAPINE 20 MG/1
20 TABLET, ORALLY DISINTEGRATING ORAL 2 TIMES DAILY
Qty: 60 TAB | Refills: 0 | Status: SHIPPED | OUTPATIENT
Start: 2020-07-29

## 2020-07-29 RX ORDER — DIVALPROEX SODIUM 500 MG/1
1500 TABLET, EXTENDED RELEASE ORAL
Qty: 90 TAB | Refills: 0 | Status: SHIPPED | OUTPATIENT
Start: 2020-07-29

## 2020-07-29 RX ADMIN — DIVALPROEX SODIUM 1500 MG: 500 TABLET, EXTENDED RELEASE ORAL at 21:00

## 2020-07-29 RX ADMIN — OLANZAPINE 20 MG: 5 TABLET, ORALLY DISINTEGRATING ORAL at 08:30

## 2020-07-29 RX ADMIN — OLANZAPINE 20 MG: 5 TABLET, ORALLY DISINTEGRATING ORAL at 20:14

## 2020-07-29 RX ADMIN — BACITRACIN ZINC NEOMYCIN SULFATE POLYMYXIN B SULFATE: 400; 3.5; 5 OINTMENT TOPICAL at 17:50

## 2020-07-29 RX ADMIN — HYDROXYZINE HYDROCHLORIDE 50 MG: 50 TABLET ORAL at 16:00

## 2020-07-29 RX ADMIN — TRAZODONE HYDROCHLORIDE 50 MG: 50 TABLET ORAL at 21:27

## 2020-07-29 NOTE — INTERDISCIPLINARY ROUNDS
Behavioral Health Interdisciplinary Rounds Patient Name: Kimberley Osman  Age: 21 y.o. Room/Bed:  734/02 Primary Diagnosis: <principal problem not specified> Admission Status: Voluntary Commitment Readmission within 30 days: no 
Power of  in place: no 
Patient requires a blocked bed: yes          Reason for blocked bed: behavior Sleep hours:  7 Participation in Care/Groups:   
Medication Compliant?: Yes PRNS (last 24 hours): Antipsychotic (PO) and Antianxiety Restraints (last 24 hours):  no 
  
Alcohol screening (AUDIT) completed -  AUDIT Score: 0  If applicable, date SBIRT discussed in treatment team AND documented:   
Tobacco - patient is a smoker: Have You Used Tobacco in the Past 30 Days: No 
Illegal Drugs use: Have You Used Any Illegal Substances Over the Past 12 Months: No 
 
24 hour chart check complete: yes Patient goal(s) for today:attend groups, take medications, [facility] to do telephone intake. Treatment team focus/goals: titrate medication, coordinate follow up Progress note: Pt is alert, oriented, required PRN last night. He talks bout his mom \"setting him up. \" He asks if he needs a whole body scan. Plan to DC Thursday. Family Contact information: Trevon Huff/[address] MSW spoke with mother again regarding delayed discharge until early next week. MSW encouraged restraining order.   
Patient's preferred phone number for follow up call : [phone]  
 
LOS:  12  Expected LOS: TBD 
  
Participating treatment team members: Keenan Ellis, NP - Lucila Gross, PharmD -ALRFEDO Porter

## 2020-07-29 NOTE — BH NOTES
Blocked Bed Documentation:    Room number: 734-01  Type: Behavior  Rationale: Poor Self-Control, verbal threats, increased aggression  Anticipated duration: hospital duration

## 2020-07-29 NOTE — BH NOTES
GROUP THERAPY PROGRESS NOTE    Lesley Moulton is participating in [place].      Group time: 20 minutes    Personal goal for participation: stay positive    Goal orientation: community    Group therapy participation: active    Therapeutic interventions reviewed and discussed: yes  Impression of participation: engaged

## 2020-07-29 NOTE — PROGRESS NOTES
Chief Complaint:  My mom pissed me off. Length of Stay: 12 Days    Interval History:  Dorothys has been appropriate in the unit, he interacts well with staff and peers. Calm and pleasant. No management issue. He denies si hi or avh. He is tolerating his meds and denies side effects. Says he will call his mother and apologize. He is encourage to reach out to the healing place today. Past Medical History:  No past medical history on file. Labs:  Lab Results   Component Value Date/Time    WBC 13.1 (H) 07/17/2020 01:19 PM    HGB 13.5 07/17/2020 01:19 PM    HCT 38.8 07/17/2020 01:19 PM    PLATELET 861 83/28/7826 01:19 PM    MCV 86.2 07/17/2020 01:19 PM      Lab Results   Component Value Date/Time    Sodium 140 07/17/2020 01:19 PM    Potassium 3.4 (L) 07/17/2020 01:19 PM    Chloride 104 07/17/2020 01:19 PM    CO2 28 07/17/2020 01:19 PM    Anion gap 8 07/17/2020 01:19 PM    Glucose 111 (H) 07/17/2020 01:19 PM    BUN 10 07/17/2020 01:19 PM    Creatinine 1.06 07/17/2020 01:19 PM    BUN/Creatinine ratio 9 (L) 07/17/2020 01:19 PM    GFR est AA >60 07/17/2020 01:19 PM    GFR est non-AA >60 07/17/2020 01:19 PM    Calcium 9.0 07/17/2020 01:19 PM    Bilirubin, total 0.4 07/17/2020 01:19 PM    Alk.  phosphatase 76 07/17/2020 01:19 PM    Protein, total 7.9 07/17/2020 01:19 PM    Albumin 3.9 07/17/2020 01:19 PM    Globulin 4.0 07/17/2020 01:19 PM    A-G Ratio 1.0 (L) 07/17/2020 01:19 PM    ALT (SGPT) 27 07/17/2020 01:19 PM      Vitals:    07/28/20 0847 07/28/20 1535 07/28/20 1953 07/29/20 0856   BP: 123/71 130/80 115/76 109/71   Pulse: (!) 101 (!) 104 (!) 102 88   Resp: 16 16 16 16   Temp: 98.3 °F (36.8 °C) 98.7 °F (37.1 °C) 98.3 °F (36.8 °C) 97.6 °F (36.4 °C)   SpO2: 100% 97% 98% 99%   Weight:       Height:             Current Facility-Administered Medications   Medication Dose Route Frequency Provider Last Rate Last Dose    chlorproMAZINE (THORAZINE) injection 50 mg  50 mg IntraMUSCular Q6H PRN Bill Lamar A, NP        OLANZapine (ZyPREXA zydis) disintegrating tablet 20 mg  20 mg Oral BID BillLamar NP   20 mg at 07/29/20 0830    diphenhydrAMINE (BENADRYL) injection 50 mg  50 mg IntraMUSCular Q6H PRN Lamar Khan, NP   50 mg at 07/27/20 1135    divalproex ER (DEPAKOTE ER) 24 hour tablet 1,500 mg  1,500 mg Oral QHS Lamar Khan NP   1,500 mg at 07/28/20 2136    LORazepam (ATIVAN) injection 2 mg  2 mg IntraMUSCular Q4H PRN Lamar Khan NP   2 mg at 07/27/20 1134    nicotine buccal (POLACRILEX) lozenge 2 mg  2 mg Oral Q2H PRN Abhinav Wheeler NP   2 mg at 07/21/20 1447    neomycin-bacitracin-polymyxin (NEOSPORIN) ointment   Topical BID Dorsie Flatter, NP   Stopped at 07/29/20 0900    loratadine (CLARITIN) tablet 10 mg  10 mg Oral DAILY PRN Dorsie Flatter, NP        OLANZapine (ZyPREXA) tablet 5 mg  5 mg Oral Q6H PRN Bermudez Form' V, FNP   5 mg at 07/28/20 1730    benztropine (COGENTIN) tablet 1 mg  1 mg Oral BID PRN Bermudez Form' V, FNP        hydrOXYzine HCL (ATARAX) tablet 50 mg  50 mg Oral TID PRN Bermudez Form' V, FNP   50 mg at 07/28/20 1630    traZODone (DESYREL) tablet 50 mg  50 mg Oral QHS PRN Bermudez Form' V, FNP   50 mg at 07/27/20 2102    acetaminophen (TYLENOL) tablet 650 mg  650 mg Oral Q4H PRN Bermudez Form' V, FNP        magnesium hydroxide (MILK OF MAGNESIA) 400 mg/5 mL oral suspension 30 mL  30 mL Oral DAILY PRN Jefry Canard, FNP             Mental Status Exam:  Eye contact: good  Grooming: good  Psychomotor activity: relaxed  Speech is spontaneous  Mood is \"great\"  Affect:full  Perception: denies ah  Suicidal ideation: denies si  Cognition is grossly intact          Physical Exam:  Body habitus: Body mass index is 26.25 kg/m². Musculoskeletal system: normal gait  Tremor - neg  Cog wheeling - neg      Assessment and Plan:  Orville De La Torre meets criteria for a diagnosis of Schizophrenia    Va level this evening.   Continue the medication regimen as prescribed  Disposition planning to the healing place. I certify that this patients inpatient psychiatric hospital services furnished since the previous certification were, and continue to be, required for treatment that could reasonably be expected to improve the patient's condition, or for diagnostic study, and that the patient continues to need, on a daily basis, active treatment furnished directly by or requiring the supervision of inpatient psychiatric facility personnel. In addition, the hospital records show that services furnished were intensive treatment services, admission or related services, or equivalent services.

## 2020-07-29 NOTE — BH NOTES
PRN Medication Documentation    Specific patient behavior that led to need for PRN medication:anxiety  Staff interventions attempted prior to PRN being given: coping skills  PRN medication given: atarax  Patient response/effectiveness of PRN medication: tl aware 17-Feb-2017 03:53

## 2020-07-29 NOTE — GROUP NOTE
OPAL  GROUP DOCUMENTATION INDIVIDUAL Group Therapy Note Date: 7/29/2020 Group Start Time: 3628 Group End Time: 1100 Group Topic: Activity Therapy [address] Maryjane HOWE 
 
[facility] GROUP DOCUMENTATION GROUP Group Therapy Note Attendees:  
 
  
 
Attendance: Attended Patient's Goal: Interventions/techniques: Validated Follows Directions: Followed directions Interactions: Interacted appropriately Mental Status: Blunted Behavior/appearance: Neatly groomed Goals Achieved: Able to listen to others Additional Notes:   
 
Ghada Tran

## 2020-07-29 NOTE — PROGRESS NOTES
Problem: Altered Thought Process (Adult/Pediatric)  Goal: *STG: Seeks staff when feelings of anxiety and fear arise  Outcome: Progressing Towards Goal  Goal: *STG: Decreased delusional thinking  Outcome: Progressing Towards Goal  Goal: *STG: Decreased hallucinations  Outcome: Progressing Towards Goal     Problem: Patient Education: Go to Patient Education Activity  Goal: Patient/Family Education  Outcome: Progressing Towards Goal     Patient calm and cooperative on the unit, seen in dining area eating breakfast.  Patient spending time in dayroom listening to music. Med compliant with morning medications. No distress noted, will continue to monitor q15 minutes for safety.     Blocked Bed Documentation:    Room number: 734B  Type: Behavior  Rationale: Impulsive  Anticipated duration: until discharge or treatment team decision  Additional comments:

## 2020-07-29 NOTE — PROGRESS NOTES
Pt in bed resting quietly. No acute distress noted. Respirations equal and unlabored. Will continue to monitor throughout shift. Problem: Falls - Risk of  Goal: *Absence of Falls  Description: Document [facility] Fall Risk and appropriate interventions in the flowsheet. Outcome: Progressing Towards Goal  Note: Fall Risk Interventions:  Mobility Interventions: Assess mobility with egress test         Medication Interventions: Teach patient to arise slowly    Elimination Interventions:  Toilet paper/wipes in reach    History of Falls Interventions: Door open when patient unattended

## 2020-07-29 NOTE — PROGRESS NOTES
Problem: Falls - Risk of  Goal: *Absence of Falls  Description: Document Dennise Marqueskin Fall Risk and appropriate interventions in the flowsheet. Outcome: Progressing Towards Goal  Note: Fall Risk Interventions:  Mobility Interventions: Assess mobility with egress test    Mentation Interventions: Door open when patient unattended    Medication Interventions: Teach patient to arise slowly    Elimination Interventions:  Toilet paper/wipes in reach    History of Falls Interventions: Door open when patient unattended    Will continue q 15 minute safety checks  Pt remains fall free

## 2020-07-30 VITALS
RESPIRATION RATE: 16 BRPM | BODY MASS INDEX: 26.35 KG/M2 | TEMPERATURE: 97.6 F | DIASTOLIC BLOOD PRESSURE: 82 MMHG | WEIGHT: 188.2 LBS | OXYGEN SATURATION: 99 % | HEIGHT: 71 IN | SYSTOLIC BLOOD PRESSURE: 116 MMHG | HEART RATE: 68 BPM

## 2020-07-30 PROCEDURE — 74011250637 HC RX REV CODE- 250/637: Performed by: NURSE PRACTITIONER

## 2020-07-30 RX ADMIN — OLANZAPINE 20 MG: 5 TABLET, ORALLY DISINTEGRATING ORAL at 08:41

## 2020-07-30 NOTE — BH NOTES
Behavioral Health Transition Record to Provider    Patient Name: Alexis Altman  YOB: 1995  Medical Record Number: [id number]  Date of Admission: 7/17/2020  Date of Discharge: 7/30/2020    Attending Provider: No att. providers found  Discharging Provider: Chon Ojeda NP  To contact this individual call [phone] and ask the  to page. If unavailable, ask to be transferred to [facility] Provider on call. [facility] Provider will be available on call 24/7 and during holidays. Primary Care Provider: None    No Known Allergies    Reason for Admission: CHIEF COMPLAINT: \"I am well\"      HISTORY OF PRESENTING COMPLAINT:  Tom Riddle a 21 y.o. BLACK OR  male who is currently admitted to the psychiatric floor at [facility]. Patient endorses he was brought in the hospital against his will and states, Yuly Jackson all wanted me out and wanted me to burn outside my room. \" Patient endorses he was staying at a room and someone called the police on him. He endorses there was a crowd of people laughing at him and the laughs got louder and louder. Police were called and the patient got in front seat and refused to get out of the passenger side. He was an [employer] and was brought into the hospital from [address] [place] [address] ED. Patient is bizarre and paranoid during assessment. Mood can be labile at times and yesterday he refused to meet with treatment team. He denies SI/HI/AVH but was observed responding to internal stimuli. Patient is poor historian and can not remember why or how he ended up at a hotel room.  Patient will be reevaluated Monday by treatment team.     Admission Diagnosis: [facility], [facility] ([facility] [F20.9]    * No surgery found *    Results for orders placed or performed during the hospital encounter of 07/17/20   SARS-COV-2   Result Value Ref Range    Specimen source Nasopharyngeal      SARS-CoV-2 Not Detected Not Detected      Specimen source Nasopharyngeal     LIPID PANEL   Result Value Ref Range    LIPID PROFILE          Cholesterol, total 120 <200 MG/DL    Triglyceride 53 <150 MG/DL    HDL Cholesterol 34 MG/DL    LDL, calculated 75.4 0 - 100 MG/DL    VLDL, calculated 10.6 MG/DL    CHOL/HDL Ratio 3.5 0.0 - 5.0     HEMOGLOBIN A1C WITH EAG   Result Value Ref Range    Hemoglobin A1c 5.6 4.0 - 5.6 %    Est. average glucose 114 mg/dL   VALPROIC ACID   Result Value Ref Range    Valproic acid 77 50 - 100 ug/ml   VALPROIC ACID   Result Value Ref Range    Valproic acid 95 50 - 100 ug/ml       Immunizations administered during this encounter: There is no immunization history on file for this patient. Screening for Metabolic Disorders for Patients on Antipsychotic Medications  (Data obtained from the EMR)    Estimated Body Mass Index  Estimated body mass index is 26.25 kg/m² as calculated from the following:    Height as of this encounter: 5' 11\" (1.803 m). Weight as of this encounter: 85.4 kg (188 lb 3.2 oz). Vital Signs/Blood Pressure  Visit Vitals  /82   Pulse 68   Temp 97.6 °F (36.4 °C)   Resp 16   Ht 5' 11\" (1.803 m)   Wt 85.4 kg (188 lb 3.2 oz)   SpO2 99%   BMI 26.25 kg/m²       Blood Glucose/Hemoglobin A1c  Lab Results   Component Value Date/Time    Glucose 111 (H) 07/17/2020 01:19 PM       Lab Results   Component Value Date/Time    Hemoglobin A1c 5.6 07/22/2020 06:39 AM        Lipid Panel  Lab Results   Component Value Date/Time    Cholesterol, total 120 07/22/2020 06:39 AM    HDL Cholesterol 34 07/22/2020 06:39 AM    LDL, calculated 75.4 07/22/2020 06:39 AM    Triglyceride 53 07/22/2020 06:39 AM    CHOL/HDL Ratio 3.5 07/22/2020 06:39 AM        Discharge Diagnosis: Schizoaffective, bipolar type    Discharge Plan: Patient discharged via medicaid cab to homeless point of entry with follow up through [facility]. The patient Anna Romeo exhibits the ability to control behavior in a less restrictive environment.   Patient's level of functioning is improving. No assaultive/destructive behavior has been observed for the past 24 hours. No suicidal/homicidal threat or behavior has been observed for the past 24 hours. There is no evidence of serious medication side effects. Patient has not been in physical or protective restraints for at least the past 24 hours. If weapons involved, how are they secured? NA    Is patient aware of and in agreement with discharge plan? Yes    Arrangements for medication:  Prescriptions given to patient, given a 30 day supply. Copy of discharge instructions to provider?:  Araceli Bhatia (fax [phone])    Arrangements for transportation home:  Medicaid Cab to transport patient to [facility] [address] at [address] [place] [address] Road to sign of up shelter waiting list and connect with homeless support services. Karen Pacheco partners with homeless services providers in the [address] [place] [address], the [facility], and public and private agencies to assist households resolve their housing crisis as quickly as possible. Those in crisis can contact Luma Jacobo to discuss the situation and to be connected with available resources. The [facility] [address] at [address] is open Monday - Friday from 8:30 am - 5:00 pm. Assessments are conducted until 4pm. If you need shelter in the [facility], contact the [address] [place] [address]: [phone]. The [address] [place] [address] facilitates access to resources and shelter alternatives for those who are three days or less away from losing their housing. This line is also available for those already experiencing homelessness. Keep all follow up appointments as scheduled, continue to take prescribed medications per physician instructions. Mental health crisis number:  232 or your local mental health crisis line number at [phone].     Discharge Medication List and Instructions:   Discharge Medication List as of 7/30/2020 11:50 AM START taking these medications    Details   OLANZapine (ZyPREXA zydis) 20 mg disintegrating tablet Take 1 Tab by mouth two (2) times a day. Indications: mood, Normal, Disp-60 Tab,R-0         CONTINUE these medications which have CHANGED    Details   divalproex ER (DEPAKOTE ER) 500 mg ER tablet Take 3 Tabs by mouth nightly. Indications: mood, Normal, Disp-90 Tab,R-0             Unresulted Labs (24h ago, onward)    None        To obtain results of studies pending at discharge, please contact [phone]    Follow-up Information     Follow up With Specialties Details Why [facility]  Call on 7/30/2020 Call [phone] Monday -Friday 8am-2pm and ask to open a case for mental health services. You will complete your intake via telephone and will be connected with psychiatric providers. This  will guide you in apply for disability (SSDI). [facility], [address] [place] [address]  [phone]     The [facility]  Go on 7/30/2020 Call [phone] ask to speak with Jerri Loaiza to complete a phone screening for [facility] [place] [facility] and [facility] Program. [address]  phone ([phone])    None    None [phone]) Patient stated that they have no PCP      The [address] at [address] [place] [address] is open Monday - Friday from 8:30 am - 5:00 pm. Assessments are conducted until 4pm. If you need shelter in the [facility], contact the [address] [place] [address] St: [phone]. Go on 7/30/2020 The [address] is open Monday - Friday from 8:30 am - 5:00 pm. Assessments are conducted until 4pm. If you need shelter in the [facility], contact the [address] [place] [address]: [phone]. The [address]   [place] [address], [facility]  Homeless Crisis Line: [phone]          Advanced Directive:   Does the patient have an appointed surrogate decision maker? No  Does the patient have a Medical Advance Directive?  No  Does the patient have a Psychiatric Advance Directive? No  If the patient does not have a surrogate or Medical Advance Directive AND Psychiatric Advance Directive, the patient was offered information on these advance directives Patient declined to complete    Patient Instructions: Please continue all medications until otherwise directed by physician. Tobacco Cessation Discharge Plan:   Is the patient a smoker and needs referral for smoking cessation? Yes  Patient referred to the following for smoking cessation with an appointment? Refused     Patient was offered medication to assist with smoking cessation at discharge? Refused  Was education for smoking cessation added to the discharge instructions? Yes    Alcohol/Substance Abuse Discharge Plan:   Does the patient have a history of substance/alcohol abuse and requires a referral for treatment? No  Patient referred to the following for substance/alcohol abuse treatment with an appointment? Not applicable  Patient was offered medication to assist with alcohol cessation at discharge? Not applicable  Was education for substance/alcohol abuse added to discharge instructions? No    Patient discharged to Home; discussed with patient/caregiver and provided to the patient/caregiver either in hard copy or electronically.

## 2020-07-30 NOTE — PROGRESS NOTES
Problem: Altered Thought Process (Adult/Pediatric)  Goal: *STG: Participates in treatment plan  Outcome: Progressing Towards Goal  Note: Pt. Less labile   more organized planning discharge home today with prescriptions     Problem: Altered Thought Process (Adult/Pediatric)  Goal: *STG: Remains safe in hospital  Outcome: Progressing Towards Goal  Note: Pt. Denies suicidal ideation     Problem: Altered Thought Process (Adult/Pediatric)  Goal: *STG: Complies with medication therapy  Outcome: Progressing Towards Goal  Note: Medication compliant  reviewed in treatment team     Problem: Altered Thought Process (Adult/Pediatric)  Goal: *STG: Decreased delusional thinking  Outcome: Progressing Towards Goal  Note: Pt. Less delusional       Problem: Altered Thought Process (Adult/Pediatric)  Goal: Interventions  Outcome: Progressing Towards Goal  Note: Will continue to monitor on 15 min.  Checks for safety  assess thought process  medication compliance  effectiveness  encourage unit participation  review discharge plans

## 2020-07-30 NOTE — PROGRESS NOTES
Pharmacist Discharge Medication Reconciliation    Discharging Provider: Juan Pablo Couch NP    Significant PMH: No past medical history on file. Chief Complaint for this Admission: No chief complaint on file. Allergies: Patient has no known allergies. Discharge Medications:   Current Discharge Medication List        START taking these medications    Details   divalproex ER (DEPAKOTE ER) 500 mg ER tablet Take 3 Tabs by mouth nightly. Indications: mood  Qty: 90 Tab, Refills: 0      OLANZapine (ZyPREXA zydis) 20 mg disintegrating tablet Take 1 Tab by mouth two (2) times a day.  Indications: mood  Qty: 60 Tab, Refills: 0           The patient's chart, MAR and AVS were reviewed by Trino Ortez PHARMD.

## 2020-07-30 NOTE — PROGRESS NOTES
Pt resting in bed quietly with eyes closed and respirations equal. NAD. Will continue to monitor throughout shift. Problem: Altered Thought Process (Adult/Pediatric)  Goal: *STG: Remains safe in hospital  Outcome: Progressing Towards Goal     Problem: Falls - Risk of  Goal: *Absence of Falls  Description: Document Lucy Fall Risk and appropriate interventions in the flowsheet. Outcome: Progressing Towards Goal  Note: Fall Risk Interventions:  Mobility Interventions: Assess mobility with egress test    Mentation Interventions: Door open when patient unattended    Medication Interventions: Teach patient to arise slowly    Elimination Interventions:  Toilet paper/wipes in reach    History of Falls Interventions: Door open when patient unattended       Blocked Bed Documentation:  Room number: 734  Type: Behavior  Rationale: Physical Aggression  Anticipated duration: TBD in treatment team

## 2020-07-30 NOTE — DISCHARGE SUMMARY
PSYCHIATRIC DISCHARGE SUMMARY    Patient: Mukund García MRN: [id number]  SSN: [id number]    YOB: 1995  Age: 21 y.o. Sex: male        Date of Admission: 7/17/2020  Date of Discharge:7/30/2020      Type of Discharge:  REGULAR     Admission data:  CHIEF COMPLAINT:  \"I am well\"        HISTORY OF PRESENTING COMPLAINT:  Mukund García is a 21 y.o. BLACK OR  male who is currently admitted to the psychiatric floor at [facility]. Patient endorses he was brought in the hospital against his will and states, Marlene Huizar all wanted me out and wanted me to burn outside my room. \" Patient endorses he was staying at a room and someone called the police on him. He endorses there was a crowd of people laughing at him and the laughs got louder and louder. Police were called and the patient got in front seat and refused to get out of the passenger side. 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 Patient is bizarre and paranoid during assessment. Mood can be labile at times and yesterday he refused to meet with treatment team. He denies SI/HI/AVH but was observed responding to internal stimuli. Patient is poor historian and can not remember why or how he ended up at a hotel room. Patient will be reevaluated Monday by treatment team.            PAST PSYCHIATRIC HISTORY and SUBSTANCE ABUSE HISTORY:  Denies any previous psychiatric hospitalizations. Endorses previous substance abuse of THC        PAST MEDICAL HISTORY:  Please see H&P for details. No past medical history on file. Prior to Admission medications    Medication Sig Start Date End Date Taking? Authorizing Provider   naproxen (NAPROSYN) 500 mg tablet TAKE 1 TABLET BY MOUTH EVERY DAY AS NEEDED HEADACHE 8/6/19     Matt HOWE NP   ergocalciferol (ERGOCALCIFEROL) 50,000 unit capsule Take 1 Cap by mouth every seven (7) days.  6/16/19     Maddi Billingsley NP                Lab Results   Component Value Date/Time     WBC 13.1 (H) 07/17/2020 01:19 PM     HGB 13.5 07/17/2020 01:19 PM     HCT 38.8 07/17/2020 01:19 PM     PLATELET 464 11/61/8317 01:19 PM     MCV 86.2 07/17/2020 01:19 PM            Lab Results   Component Value Date/Time     Sodium 140 07/17/2020 01:19 PM     Potassium 3.4 (L) 07/17/2020 01:19 PM     Chloride 104 07/17/2020 01:19 PM     CO2 28 07/17/2020 01:19 PM     Anion gap 8 07/17/2020 01:19 PM     Glucose 111 (H) 07/17/2020 01:19 PM     BUN 10 07/17/2020 01:19 PM     Creatinine 1.06 07/17/2020 01:19 PM     BUN/Creatinine ratio 9 (L) 07/17/2020 01:19 PM     GFR est AA >60 07/17/2020 01:19 PM     GFR est non-AA >60 07/17/2020 01:19 PM     Calcium 9.0 07/17/2020 01:19 PM     Bilirubin, total 0.4 07/17/2020 01:19 PM     Alk. phosphatase 76 07/17/2020 01:19 PM     Protein, total 7.9 07/17/2020 01:19 PM     Albumin 3.9 07/17/2020 01:19 PM     Globulin 4.0 07/17/2020 01:19 PM     A-G Ratio 1.0 (L) 07/17/2020 01:19 PM     ALT (SGPT) 27 07/17/2020 01:19 PM             Vitals:     07/19/20 1609 07/20/20 0802 07/20/20 1552 07/20/20 1938   BP: 98/65 129/77 122/79 124/85   Pulse: 82 77 84 73   Resp: 14 16 16 16   Temp: 97.7 °F (36.5 °C) 97.9 °F (36.6 °C) 98.3 °F (36.8 °C)     SpO2: 100% 100% 100% 100%   Weight:           Height:                  PSYCHOSOCIAL HISTORY:  Endorses he lives alone   No children        MENTAL STATUS EXAM:  General appearance: psychomotor activity is calm  Eye contact: Fair eye contact  Speech: Spontaneous  Affect : Constricted  Mood: Labile  Thought Process: Paranoid/Grandiose  Perception: Denies AH or VH.    Thought Content:Denies SI or Plan  Insight: Poor  Judgement: Poor  Cognition: Impaired         ASSESSMENT AND PLAN:  Esteban Levels meets criteria for a diagnosis of  Schizophrenia         Hospital Course:    Patient was admitted to the Psychiatric services for acute psychiatric stabilization in regards to symptomatology as described in the HPI above and placed on Q15 minute checks and suicideprecautions. Standing medications were ordered. He was started on zyprexa and depakote and both were adjusted. While on the unit Shiva Renteria was involved in individual, group, occupational and milieu therapy. He improved gradually and was able to integrate into the milieu with help from the nursing staff. Patients symptoms improved gradually including agitation, psychosis, hallucinations, paranoia, homicidal ideation. He was appropriate in his interactions, and cooperative with medications and the unit routine. Please see individual progress notes for more specific details regarding patient's hospitalization course. Patient was discharged as per the plan. He had been doing well on the unit as per the report of the nursing staff and my observations. No PRN medication for agitation, seclusion or restraints were required during the last 48 hours of his stay. Shiva Renteria had improved progressively to the point of being stable for discharge and outpatient FU. At this time he did not offer any complaints. Patient denied any SI or HI. Denied any AH or VH. He denied any delusions. Was not considered a danger to self or to others and is safe for discharge. Will FU with his appointments and remains motivated to be in treatment. The patient verbalized understanding of his discharge instructions. Some parts of the discharge summary are from the initial Psychiatric interview that was done on admission by the admitting psychiatrist.       Allergies:(reviewed/updated 7/30/2020)  No Known Allergies    Side Effects: (reviewed/updated 7/30/2020)  None reported or admitted to.     Vital Signs:  Patient Vitals for the past 24 hrs:   Temp Pulse Resp BP SpO2   07/30/20 0755 97.6 °F (36.4 °C) 68 16 116/82 99 %   07/29/20 1551 98.1 °F (36.7 °C) (!) 105 16 (!) 122/96 96 %     Wt Readings from Last 3 Encounters:   07/19/20 85.4 kg (188 lb 3.2 oz)   07/17/20 93 kg (205 lb)   06/13/19 93 kg (205 lb)     Temp Readings from Last 3 Encounters:   07/30/20 97.6 °F (36.4 °C)   07/17/20 98.2 °F (36.8 °C)   06/13/19 98.6 °F (37 °C) (Oral)     BP Readings from Last 3 Encounters:   07/30/20 116/82   07/17/20 132/84   06/13/19 119/81     Pulse Readings from Last 3 Encounters:   07/30/20 68   07/17/20 76   06/13/19 77       Labs: (reviewed/updated 7/30/2020)  Recent Results (from the past 24 hour(s))   VALPROIC ACID    Collection Time: 07/29/20  6:40 PM   Result Value Ref Range    Valproic acid 95 50 - 100 ug/ml     Lab Results   Component Value Date/Time    Valproic acid 95 07/29/2020 06:40 PM     No results found for: [facility] [place] [place]    Radiology (reviewed/updated 7/30/2020)  No results found. Mental Status Exam on Discharge:  General appearance:   Moise Snowden is a 21 y.o. BLACK OR  male who is well groomed, psychomotor activity is WNL  Eye contact: makes good eye contact  Speech: Spontaneous and coherent  Affect : Euthymic  Mood: \"OK\"  Thought Process: Logical, goal directed  Perception: Denies any AH or VH. Thought Content: Denies any SI or Plan  Insight: Partial  Judgement: Fair  Cognition: Intact grossly. Discharge Diagnosis:   Schizoaffective, bipolar type. Current Discharge Medication List      START taking these medications    Details   divalproex ER (DEPAKOTE ER) 500 mg ER tablet Take 3 Tabs by mouth nightly. Indications: mood  Qty: 90 Tab, Refills: 0      OLANZapine (ZyPREXA zydis) 20 mg disintegrating tablet Take 1 Tab by mouth two (2) times a day. Indications: mood  Qty: 60 Tab, Refills: 0                  Follow-up Information     Follow up With Specialties Details Why [address]  Call on 7/30/2020 Call [phone] Monday -Friday 8am-2pm and ask to open a case for mental health services. You will complete your intake via telephone and will be connected with psychiatric providers. This  will guide you in apply for disability (SSDI).  [address] [facility]  [phone]     [facility]  Go on 7/30/2020 Call [phone] ask to speak with Abebe Look to complete a phone screening for [address] and Recovery Program. [address]  phone ([phone])    None    None [phone]) Patient stated that they have no PCP      The [address] [facility] is open Monday - Friday from 8:30 am - 5:00 pm. Assessments are conducted until 4pm. If you need shelter in the [facility], contact the [facility] [address]: [phone]. Go on 7/30/2020 The [address] is open Monday - Friday from 8:30 am - 5:00 pm. Assessments are conducted until 4pm. If you need shelter in the [facility], contact the [facility] [address]: [phone]. The [address]   [facility], [address] [facility]  Homeless Crisis Line: [phone]        WOUND CARE: none needed. Prognosis:   Good / April Graves based on nature of patient's pathology/ies and treatment compliance issues. Prognosis is greatly dependent upon patient's ability to  follow up on psychiatric/psychotherapy appointments as well as to comply with psychiatric medications as prescribed. I certify that this patients inpatient psychiatric hospital services furnished since the previous certification were, and continue to be, required for treatment that could reasonably be expected to improve the patient's condition, or for diagnostic study, and that the patient continues to need, on a daily basis, active treatment furnished directly by or requiring the supervision of inpatient psychiatric facility personnel. In addition, the hospital records show that services furnished were intensive treatment services, admission or related services, or equivalent services.      Signed:  Abhi Wilson NP  7/30/2020

## 2020-07-30 NOTE — INTERDISCIPLINARY ROUNDS
Behavioral Health Interdisciplinary Rounds Patient Name: Orville De La Torre  Age: 21 y.o. Room/Bed:  734/02 Primary Diagnosis: <principal problem not specified> Admission Status: Involuntary Commitment Readmission within 30 days: no 
Power of  in place: no 
Patient requires a blocked bed: yes          Reason for blocked bed: behavior Sleep hours:  8 Participation in Care/Groups:  Yes Medication Compliant?: Yes PRNS (last 24 hours): Antianxiety and Sleep Aid Restraints (last 24 hours):  no 
  
Alcohol screening (AUDIT) completed -  AUDIT Score: 0  If applicable, date SBIRT discussed in treatment team AND documented:   
Tobacco - patient is a smoker: Have You Used Tobacco in the Past 30 Days: No 
Illegal Drugs use: Have You Used Any Illegal Substances Over the Past 12 Months: No 
 
24 hour chart check complete:  
 
Patient goal(s) for today:  
Treatment team focus/goals:  
Progress note Family Contact information:  
Patient's preferred phone number for follow up call :  
 
LOS:  13  Expected LOS:  
 
Participating treatment team members: Orville De La Torre, * (assigned SW),

## 2020-07-30 NOTE — PROGRESS NOTES
Laboratory Monitoring for Divalproex/Valproic Acid: This patient is currently prescribed the following medication(s):   Current Facility-Administered Medications   Medication Dose Route Frequency    OLANZapine (ZyPREXA zydis) disintegrating tablet 20 mg  20 mg Oral BID    divalproex ER (DEPAKOTE ER) 24 hour tablet 1,500 mg  1,500 mg Oral QHS    neomycin-bacitracin-polymyxin (NEOSPORIN) ointment   Topical BID     The following labs have been completed for monitoring of valproic acid:    Valproic Acid Serum Concentration  Lab Results   Component Value Date/Time    Valproic acid 95 07/29/2020 06:40 PM       Hepatic Function  Lab Results   Component Value Date/Time    Bilirubin, total 0.4 07/17/2020 01:19 PM    Protein, total 7.9 07/17/2020 01:19 PM    Albumin 3.9 07/17/2020 01:19 PM    Globulin 4.0 07/17/2020 01:19 PM    A-G Ratio 1.0 (L) 07/17/2020 01:19 PM    ALT (SGPT) 27 07/17/2020 01:19 PM    AST (SGOT) 35 07/17/2020 01:19 PM    Alk. phosphatase 76 07/17/2020 01:19 PM     Hematology  Lab Results   Component Value Date/Time    WBC 13.1 (H) 07/17/2020 01:19 PM    RBC 4.50 07/17/2020 01:19 PM    HGB 13.5 07/17/2020 01:19 PM    HCT 38.8 07/17/2020 01:19 PM    MCV 86.2 07/17/2020 01:19 PM    MCH 30.0 07/17/2020 01:19 PM    MCHC 34.8 07/17/2020 01:19 PM    RDW 12.9 07/17/2020 01:19 PM    PLATELET 920 79/01/2678 01:19 PM     Assessment/Plan:  A valproic acid level was drawn on 7/29 @ 1840 and found to be 95 mcg/mL. This level is reflective of a steady state trough level and is considered therapeutic. Based on this level, recommend continuing the current dose of 1500mg ER QHS upon discharge.      Marilee Flores, PharmD, BCPP, [facility] Specialist, [address] [place] [address]

## 2020-07-30 NOTE — DISCHARGE INSTRUCTIONS
DISCHARGE SUMMARY    NAME:Keenan Walter  : 1995  MRN: [id number]    The patient [facility] exhibits the ability to control behavior in a less restrictive environment. Patient's level of functioning is improving. No assaultive/destructive behavior has been observed for the past 24 hours. No suicidal/homicidal threat or behavior has been observed for the past 24 hours. There is no evidence of serious medication side effects. Patient has not been in physical or protective restraints for at least the past 24 hours. If weapons involved, how are they secured? NA    Is patient aware of and in agreement with discharge plan? Yes    Arrangements for medication:  Prescriptions given to patient, given a 30 day supply. Copy of discharge instructions to provider?:  Tatyana Snider (fax [phone])    Arrangements for transportation home:  Medicaid Cab to transport patient to [facility] at [address] [place] [address] Road to sign of up shelter waiting list and connect with homeless support services. [employer] partners with homeless services providers in the [address] [place] [address], the [facility], and public and private agencies to assist households resolve their housing crisis as quickly as possible. Those in crisis can contact Luma Jacobo to discuss the situation and to be connected with available resources. The [facility] [address] at [address] [place] [address] is open Monday - Friday from 8:30 am - 5:00 pm. Assessments are conducted until 4pm. If you need shelter in the [facility], contact the [address] [place] [address]: [phone]. The [address] [place] [address] facilitates access to resources and shelter alternatives for those who are three days or less away from losing their housing. This line is also available for those already experiencing homelessness.     Keep all follow up appointments as scheduled, continue to take prescribed medications per physician instructions. Mental health crisis number:  687 or your local mental health crisis line number at [phone]. DISCHARGE SUMMARY from Nurse prescriptions filled and given to pt. PATIENT INSTRUCTIONS:    What to do at Home:  Recommended activity: Activity as tolerated,     *  Please give a list of your current medications to your Primary Care Provider. *  Please update this list whenever your medications are discontinued, doses are      changed, or new medications (including over-the-counter products) are added. *  Please carry medication information at all times in case of emergency situations. These are general instructions for a healthy lifestyle:    No smoking/ No tobacco products/ Avoid exposure to second hand smoke  Surgeon General's Warning:  Quitting smoking now greatly reduces serious risk to your health. Obesity, smoking, and sedentary lifestyle greatly increases your risk for illness    A healthy diet, regular physical exercise & weight monitoring are important for maintaining a healthy lifestyle    You may be retaining fluid if you have a history of heart failure or if you experience any of the following symptoms:  Weight gain of 3 pounds or more overnight or 5 pounds in a week, increased swelling in our hands or feet or shortness of breath while lying flat in bed. Please call your doctor as soon as you notice any of these symptoms; do not wait until your next office visit. The discharge information has been reviewed with the patient. The patient verbalized understanding. Discharge medications reviewed with the patient and appropriate educational materials and side effects teaching were provided. [employer] Activation    Thank you for requesting access to [employer]. Please follow the instructions below to securely access and download your online medical record.  [employer] allows you to send messages to your doctor, view your test results, renew your prescriptions, schedule appointments, and more. How Do I Sign Up? 1. In your internet browser, go to [email or web address]  2. Click on the First Time User? Click Here link in the Sign In box. You will be redirect to the New Member Sign Up page. 3. Enter your [email or web address] Access Code exactly as it appears below. You will not need to use this code after youve completed the sign-up process. If you do not sign up before the expiration date, you must request a new code. [email or web address] Access Code: [email or web address]  Expires: 2020 12:46 PM (This is the date your [email or web address] access code will )    4. Enter the last four digits of your Social Security Number (xxxx) and Date of Birth (mm/dd/yyyy) as indicated and click Submit. You will be taken to the next sign-up page. 5. Create a [email or web address] ID. This will be your [email or web address] login ID and cannot be changed, so think of one that is secure and easy to remember. 6. Create a [email or web address] password. You can change your password at any time. 7. Enter your Password Reset Question and Answer. This can be used at a later time if you forget your password. 8. Enter your e-mail address. You will receive e-mail notification when new information is available in [address] 9. Click Sign Up. You can now view and download portions of your medical record. 10. Click the Download Summary menu link to download a portable copy of your medical information. Additional Information    If you have questions, please visit the Frequently Asked Questions section of the [email or web address] website at [email or web address] Remember, [email or web address] is NOT to be used for urgent needs.  For medical emergencies, dial 911.        ___________________________________________________________________________________________________________________________________

## 2022-03-18 PROBLEM — R73.03 PREDIABETES: Status: ACTIVE | Noted: 2019-06-16

## 2022-03-20 PROBLEM — F20.9 SCHIZOPHRENIA, UNSPECIFIED (HCC): Status: ACTIVE | Noted: 2020-07-17

## 2023-05-21 RX ORDER — OLANZAPINE 20 MG/1
20 TABLET, ORALLY DISINTEGRATING ORAL 2 TIMES DAILY
COMMUNITY
Start: 2020-07-29

## 2023-05-21 RX ORDER — DIVALPROEX SODIUM 500 MG/1
1500 TABLET, EXTENDED RELEASE ORAL
COMMUNITY
Start: 2020-07-29

## 2024-03-24 ENCOUNTER — HOSPITAL ENCOUNTER (EMERGENCY)
Facility: HOSPITAL | Age: 29
Discharge: HOME | End: 2024-03-24
Payer: MEDICAID

## 2024-03-24 VITALS
WEIGHT: 193 LBS | TEMPERATURE: 99 F | OXYGEN SATURATION: 100 % | SYSTOLIC BLOOD PRESSURE: 123 MMHG | HEIGHT: 70 IN | RESPIRATION RATE: 22 BRPM | DIASTOLIC BLOOD PRESSURE: 77 MMHG | BODY MASS INDEX: 27.63 KG/M2 | HEART RATE: 116 BPM

## 2024-03-24 DIAGNOSIS — Z86.59 HX OF SCHIZOPHRENIA: Primary | ICD-10-CM

## 2024-03-24 DIAGNOSIS — Z91.148 NON COMPLIANCE W MEDICATION REGIMEN: ICD-10-CM

## 2024-03-24 PROCEDURE — 90791 PSYCH DIAGNOSTIC EVALUATION: CPT

## 2024-03-24 PROCEDURE — 99282 EMERGENCY DEPT VISIT SF MDM: CPT

## 2024-03-24 ASSESSMENT — PAIN - FUNCTIONAL ASSESSMENT: PAIN_FUNCTIONAL_ASSESSMENT: NONE - DENIES PAIN

## 2024-03-24 NOTE — BSMART NOTE
Comprehensive Assessment Form Part 1    Section I - Disposition    Primary Diagnosis: Schizophrenia    The Medical Doctor to Psychiatrist conference was not completed.  The Medical Doctor is in agreement with Psychiatrist disposition because of (reason) patient does not want to be admitted and patient does not meet TDO criteria at this time.  The plan is discharge with outpatient treatment resources.  The on-call Psychiatrist consulted was Dr. SANCHEZ.  The admitting Psychiatrist will be Dr. SANCHEZ.  The admitting Diagnosis is NA.  The Payor source is [facility] Medicaid.  This writer reviewed the [place] Suicide Severity Rating Scale in nursing flowsheet and the risk level assigned is no risk.  Based on this assessment, the risk of suicide is none and the plan is discharge.    Section II - Integrated Summary  Summary:  Patient is a 28 year old male seen face to face in the ER.  He was brought to the hospital by his mother for a mental health assessment, however he reported he has been \"walking around the city,\" and complained of foot pain.  Patient has a history of schizophrenia, including multiple inpatient admissions for psychosis.  Patient's only admission in the [facility] system appears to be at [facility] in July 2020 according to his medical record.  Patient's mother reported he has not taken medication for 2 years.  She stated his most recent admission was 2 years ago at [facility].  Patient was working, but lost his job on Friday (2 days ago), according to his mother.  She also reported that patient has been living with his father, but his father \"put him out\" this weekend.  Patient is not able to stay with her, but she does not want him \"in the streets.\"  Over the years she has spent a significant amount of money housing him in various hotels, but she is not able to do this anymore.  Patient denied having any concerns in regards to his housing, and brought up staying in hotels as a solution to not sleep on

## 2024-03-24 NOTE — ED PROVIDER NOTES
software. Please disregards these errors. Please excuse any errors that have escaped final proofreading.)         Kaelyn Fernandes, MAYNOR - NP  03/24/24 7370

## 2024-03-24 NOTE — ED TRIAGE NOTES
Patient comes to the ED  \"to be checked out\".  Flight of ideas and stated he has been \"walking around the city\".

## 2024-03-24 NOTE — ED NOTES
Pt presents ambulatory to ED complaining of mental health crisis and feet pain. Pt's mother states that episodes been going on for days. Pt is alert and oriented x 4, RR even and unlabored, skin is warm and dry. Assesment completed and pt updated on plan of care.       Emergency Department Nursing Plan of Care       The Nursing Plan of Care is developed from the Nursing assessment and Emergency Department Attending provider initial evaluation.  The plan of care may be reviewed in the “ED Provider note”.    The Plan of Care was developed with the following considerations:   Patient / Family readiness to learn indicated by:verbalized understanding  Persons(s) to be included in education: patient  Barriers to Learning/Limitations:None    Signed     Sandee Frederick RN    3/24/2024   1:01 PM

## 2024-03-24 NOTE — ED NOTES
Patient (s)  given copy of dc instructions and 0 script(s). Patient (s)  verbalized understanding of instructions a Patient alert and oriented and in no acute distress. Patient discharged home ambulatory with self.

## 2024-03-24 NOTE — BSMART NOTE
BSMART assessment completed, and suicide risk level noted to be no risk. Primary Nurse Sandee and NP Twyla notified. Concerns not observed.